# Patient Record
Sex: FEMALE | Race: WHITE | NOT HISPANIC OR LATINO | URBAN - METROPOLITAN AREA
[De-identification: names, ages, dates, MRNs, and addresses within clinical notes are randomized per-mention and may not be internally consistent; named-entity substitution may affect disease eponyms.]

---

## 2017-06-12 ENCOUNTER — OUTPATIENT (OUTPATIENT)
Dept: OUTPATIENT SERVICES | Facility: HOSPITAL | Age: 32
LOS: 1 days | Discharge: HOME | End: 2017-06-12

## 2017-06-28 DIAGNOSIS — Z34.00 ENCOUNTER FOR SUPERVISION OF NORMAL FIRST PREGNANCY, UNSPECIFIED TRIMESTER: ICD-10-CM

## 2017-06-29 ENCOUNTER — OUTPATIENT (OUTPATIENT)
Dept: OUTPATIENT SERVICES | Facility: HOSPITAL | Age: 32
LOS: 1 days | Discharge: HOME | End: 2017-06-29

## 2017-06-29 DIAGNOSIS — Z34.00 ENCOUNTER FOR SUPERVISION OF NORMAL FIRST PREGNANCY, UNSPECIFIED TRIMESTER: ICD-10-CM

## 2017-08-04 ENCOUNTER — INPATIENT (INPATIENT)
Facility: HOSPITAL | Age: 32
LOS: 0 days | Discharge: HOME | End: 2017-08-05
Attending: OBSTETRICS & GYNECOLOGY | Admitting: OBSTETRICS & GYNECOLOGY

## 2017-08-08 DIAGNOSIS — Z34.83 ENCOUNTER FOR SUPERVISION OF OTHER NORMAL PREGNANCY, THIRD TRIMESTER: ICD-10-CM

## 2017-08-08 DIAGNOSIS — Z88.0 ALLERGY STATUS TO PENICILLIN: ICD-10-CM

## 2017-08-08 DIAGNOSIS — Z88.8 ALLERGY STATUS TO OTHER DRUGS, MEDICAMENTS AND BIOLOGICAL SUBSTANCES: ICD-10-CM

## 2017-08-08 DIAGNOSIS — Z3A.40 40 WEEKS GESTATION OF PREGNANCY: ICD-10-CM

## 2017-09-06 PROBLEM — Z00.00 ENCOUNTER FOR PREVENTIVE HEALTH EXAMINATION: Status: ACTIVE | Noted: 2017-09-06

## 2018-02-19 ENCOUNTER — RX RENEWAL (OUTPATIENT)
Age: 33
End: 2018-02-19

## 2018-07-06 ENCOUNTER — OUTPATIENT (OUTPATIENT)
Dept: OUTPATIENT SERVICES | Facility: HOSPITAL | Age: 33
LOS: 1 days | Discharge: HOME | End: 2018-07-06

## 2018-07-06 DIAGNOSIS — Z00.00 ENCOUNTER FOR GENERAL ADULT MEDICAL EXAMINATION WITHOUT ABNORMAL FINDINGS: ICD-10-CM

## 2018-10-08 ENCOUNTER — OUTPATIENT (OUTPATIENT)
Dept: OUTPATIENT SERVICES | Facility: HOSPITAL | Age: 33
LOS: 1 days | Discharge: HOME | End: 2018-10-08

## 2018-10-08 DIAGNOSIS — I07.1 RHEUMATIC TRICUSPID INSUFFICIENCY: ICD-10-CM

## 2018-10-08 DIAGNOSIS — I37.1 NONRHEUMATIC PULMONARY VALVE INSUFFICIENCY: ICD-10-CM

## 2018-10-08 DIAGNOSIS — R00.2 PALPITATIONS: ICD-10-CM

## 2018-10-08 DIAGNOSIS — I34.0 NONRHEUMATIC MITRAL (VALVE) INSUFFICIENCY: ICD-10-CM

## 2018-10-31 ENCOUNTER — LABORATORY RESULT (OUTPATIENT)
Age: 33
End: 2018-10-31

## 2018-10-31 ENCOUNTER — OUTPATIENT (OUTPATIENT)
Dept: OUTPATIENT SERVICES | Facility: HOSPITAL | Age: 33
LOS: 1 days | Discharge: HOME | End: 2018-10-31

## 2018-10-31 DIAGNOSIS — N91.2 AMENORRHEA, UNSPECIFIED: ICD-10-CM

## 2018-11-06 ENCOUNTER — LABORATORY RESULT (OUTPATIENT)
Age: 33
End: 2018-11-06

## 2018-11-06 ENCOUNTER — OUTPATIENT (OUTPATIENT)
Dept: OUTPATIENT SERVICES | Facility: HOSPITAL | Age: 33
LOS: 1 days | Discharge: HOME | End: 2018-11-06

## 2018-11-06 ENCOUNTER — APPOINTMENT (OUTPATIENT)
Dept: OBGYN | Facility: CLINIC | Age: 33
End: 2018-11-06
Payer: COMMERCIAL

## 2018-11-06 VITALS — HEIGHT: 66 IN | WEIGHT: 128 LBS | BODY MASS INDEX: 20.57 KG/M2

## 2018-11-06 DIAGNOSIS — N91.2 AMENORRHEA, UNSPECIFIED: ICD-10-CM

## 2018-11-06 PROCEDURE — 81003 URINALYSIS AUTO W/O SCOPE: CPT | Mod: QW

## 2018-11-06 PROCEDURE — 76830 TRANSVAGINAL US NON-OB: CPT

## 2018-11-06 PROCEDURE — 99395 PREV VISIT EST AGE 18-39: CPT | Mod: 25

## 2018-11-12 DIAGNOSIS — Z01.419 ENCOUNTER FOR GYNECOLOGICAL EXAMINATION (GENERAL) (ROUTINE) WITHOUT ABNORMAL FINDINGS: ICD-10-CM

## 2018-11-18 LAB
BILIRUB UR QL STRIP: NORMAL
CLARITY UR: CLEAR
GLUCOSE UR-MCNC: NORMAL
HCG UR QL: NORMAL EU/DL
HGB UR QL STRIP.AUTO: NORMAL
KETONES UR-MCNC: NORMAL
LEUKOCYTE ESTERASE UR QL STRIP: NORMAL
NITRITE UR QL STRIP: NORMAL
PH UR STRIP: 5
PROT UR STRIP-MCNC: NORMAL
SP GR UR STRIP: 1.02

## 2018-11-19 ENCOUNTER — APPOINTMENT (OUTPATIENT)
Dept: OBGYN | Facility: CLINIC | Age: 33
End: 2018-11-19
Payer: COMMERCIAL

## 2018-11-19 VITALS — BODY MASS INDEX: 20.5 KG/M2 | WEIGHT: 127 LBS

## 2018-11-19 PROCEDURE — 99213 OFFICE O/P EST LOW 20 MIN: CPT | Mod: 25

## 2018-11-19 PROCEDURE — 76830 TRANSVAGINAL US NON-OB: CPT

## 2018-11-28 ENCOUNTER — LABORATORY RESULT (OUTPATIENT)
Age: 33
End: 2018-11-28

## 2018-11-28 ENCOUNTER — OUTPATIENT (OUTPATIENT)
Dept: OUTPATIENT SERVICES | Facility: HOSPITAL | Age: 33
LOS: 1 days | Discharge: HOME | End: 2018-11-28

## 2018-11-28 DIAGNOSIS — Z34.00 ENCOUNTER FOR SUPERVISION OF NORMAL FIRST PREGNANCY, UNSPECIFIED TRIMESTER: ICD-10-CM

## 2018-12-10 ENCOUNTER — APPOINTMENT (OUTPATIENT)
Dept: OBGYN | Facility: CLINIC | Age: 33
End: 2018-12-10
Payer: COMMERCIAL

## 2018-12-10 VITALS — BODY MASS INDEX: 20.73 KG/M2 | WEIGHT: 129 LBS | HEIGHT: 66 IN

## 2018-12-10 PROCEDURE — 0502F SUBSEQUENT PRENATAL CARE: CPT

## 2018-12-15 ENCOUNTER — NON-APPOINTMENT (OUTPATIENT)
Age: 33
End: 2018-12-15

## 2018-12-26 ENCOUNTER — NON-APPOINTMENT (OUTPATIENT)
Age: 33
End: 2018-12-26

## 2018-12-30 LAB
GLUCOSE UR-MCNC: NORMAL
HGB UR QL STRIP.AUTO: NORMAL
KETONES UR-MCNC: NORMAL
LEUKOCYTE ESTERASE UR QL STRIP: NORMAL
NITRITE UR QL STRIP: NORMAL
PROT UR STRIP-MCNC: NORMAL

## 2018-12-31 ENCOUNTER — APPOINTMENT (OUTPATIENT)
Dept: OBGYN | Facility: CLINIC | Age: 33
End: 2018-12-31
Payer: COMMERCIAL

## 2018-12-31 VITALS — BODY MASS INDEX: 20.98 KG/M2 | WEIGHT: 130 LBS

## 2018-12-31 DIAGNOSIS — N93.8 OTHER SPECIFIED ABNORMAL UTERINE AND VAGINAL BLEEDING: ICD-10-CM

## 2018-12-31 DIAGNOSIS — M94.0 CHONDROCOSTAL JUNCTION SYNDROME [TIETZE]: ICD-10-CM

## 2018-12-31 PROCEDURE — 0502F SUBSEQUENT PRENATAL CARE: CPT

## 2019-01-01 ENCOUNTER — NON-APPOINTMENT (OUTPATIENT)
Age: 34
End: 2019-01-01

## 2019-01-01 PROBLEM — M94.0 COSTOCHONDRITIS: Status: ACTIVE | Noted: 2019-01-01

## 2019-01-01 PROBLEM — N93.8 DYSFUNCTIONAL UTERINE BLEEDING: Status: ACTIVE | Noted: 2019-01-01

## 2019-01-07 ENCOUNTER — NON-APPOINTMENT (OUTPATIENT)
Age: 34
End: 2019-01-07

## 2019-01-16 ENCOUNTER — NON-APPOINTMENT (OUTPATIENT)
Age: 34
End: 2019-01-16

## 2019-01-22 ENCOUNTER — NON-APPOINTMENT (OUTPATIENT)
Age: 34
End: 2019-01-22

## 2019-01-22 ENCOUNTER — APPOINTMENT (OUTPATIENT)
Dept: OBGYN | Facility: CLINIC | Age: 34
End: 2019-01-22
Payer: COMMERCIAL

## 2019-01-22 VITALS
WEIGHT: 133 LBS | BODY MASS INDEX: 21.38 KG/M2 | HEIGHT: 66 IN | SYSTOLIC BLOOD PRESSURE: 118 MMHG | DIASTOLIC BLOOD PRESSURE: 70 MMHG

## 2019-01-22 LAB
BILIRUB UR QL STRIP: NORMAL
CLARITY UR: CLEAR
GLUCOSE UR-MCNC: NORMAL
HCG UR QL: NORMAL EU/DL
HGB UR QL STRIP.AUTO: NORMAL
KETONES UR-MCNC: NORMAL
LEUKOCYTE ESTERASE UR QL STRIP: NORMAL
NITRITE UR QL STRIP: NORMAL
PH UR STRIP: NORMAL
PROT UR STRIP-MCNC: NORMAL
SP GR UR STRIP: NORMAL

## 2019-01-22 PROCEDURE — 0502F SUBSEQUENT PRENATAL CARE: CPT

## 2019-01-24 ENCOUNTER — RX CHANGE (OUTPATIENT)
Age: 34
End: 2019-01-24

## 2019-01-24 RX ORDER — VITAMIN A, ASCORBIC ACID, VITAMIN D, .ALPHA.-TOCOPHEROL, THIAMINE MONONITRATE, RIBOFLAVIN, NIACIN, PYRIDOXINE HYDROCHLORIDE, FOLIC ACID, CYANOCOBALAMIN, CALCIUM, IRON, MAGNESIUM, ZINC, AND COPPER 2700; 70; 400; 30; 1.6; 1.8; 18; 2.5; 1; 12; 100; 65; 25; 25; 2 [IU]/1; MG/1; [IU]/1; [IU]/1; MG/1; MG/1; MG/1; MG/1; MG/1; UG/1; MG/1; MG/1; MG/1; MG/1; MG/1
TABLET ORAL DAILY
Qty: 90 | Refills: 0 | Status: ACTIVE | COMMUNITY
Start: 2019-01-24

## 2019-01-24 RX ORDER — ASCORBIC ACID, CHOLECALCIFEROL, .ALPHA.-TOCOPHEROL ACETATE, DL-, PYRIDOXINE, FOLIC ACID, CYANOCOBALAMIN, CALCIUM, FERROUS FUMARATE, MAGNESIUM, DOCONEXENT 85; 200; 10; 25; 1; 12; 140; 27; 45; 300 [IU]/1; [IU]/1; [IU]/1; [IU]/1; MG/1; UG/1; MG/1; MG/1; MG/1; MG/1
27-0.6-0.4-3 CAPSULE, GELATIN COATED ORAL
Qty: 90 | Refills: 2 | Status: DISCONTINUED | COMMUNITY
Start: 2018-02-19 | End: 2019-01-24

## 2019-01-28 ENCOUNTER — RX RENEWAL (OUTPATIENT)
Age: 34
End: 2019-01-28

## 2019-02-06 ENCOUNTER — RESULT CHARGE (OUTPATIENT)
Age: 34
End: 2019-02-06

## 2019-02-06 ENCOUNTER — CHART COPY (OUTPATIENT)
Age: 34
End: 2019-02-06

## 2019-02-14 ENCOUNTER — NON-APPOINTMENT (OUTPATIENT)
Age: 34
End: 2019-02-14

## 2019-02-14 ENCOUNTER — APPOINTMENT (OUTPATIENT)
Dept: OBGYN | Facility: CLINIC | Age: 34
End: 2019-02-14
Payer: COMMERCIAL

## 2019-02-14 VITALS — DIASTOLIC BLOOD PRESSURE: 70 MMHG | SYSTOLIC BLOOD PRESSURE: 120 MMHG | WEIGHT: 132 LBS | BODY MASS INDEX: 21.31 KG/M2

## 2019-02-14 PROCEDURE — 0502F SUBSEQUENT PRENATAL CARE: CPT

## 2019-02-14 PROCEDURE — 76805 OB US >/= 14 WKS SNGL FETUS: CPT

## 2019-03-05 ENCOUNTER — NON-APPOINTMENT (OUTPATIENT)
Age: 34
End: 2019-03-05

## 2019-03-05 ENCOUNTER — APPOINTMENT (OUTPATIENT)
Dept: OBGYN | Facility: CLINIC | Age: 34
End: 2019-03-05
Payer: COMMERCIAL

## 2019-03-05 VITALS — WEIGHT: 136 LBS | BODY MASS INDEX: 21.86 KG/M2 | HEIGHT: 66 IN

## 2019-03-05 PROCEDURE — 0502F SUBSEQUENT PRENATAL CARE: CPT

## 2019-03-09 ENCOUNTER — APPOINTMENT (OUTPATIENT)
Dept: OBGYN | Facility: CLINIC | Age: 34
End: 2019-03-09
Payer: COMMERCIAL

## 2019-03-09 ENCOUNTER — OUTPATIENT (OUTPATIENT)
Dept: OUTPATIENT SERVICES | Facility: HOSPITAL | Age: 34
LOS: 1 days | Discharge: HOME | End: 2019-03-09

## 2019-03-09 ENCOUNTER — LABORATORY RESULT (OUTPATIENT)
Age: 34
End: 2019-03-09

## 2019-03-09 DIAGNOSIS — Z34.00 ENCOUNTER FOR SUPERVISION OF NORMAL FIRST PREGNANCY, UNSPECIFIED TRIMESTER: ICD-10-CM

## 2019-03-09 PROCEDURE — 0502F SUBSEQUENT PRENATAL CARE: CPT

## 2019-03-11 ENCOUNTER — NON-APPOINTMENT (OUTPATIENT)
Age: 34
End: 2019-03-11

## 2019-03-26 ENCOUNTER — APPOINTMENT (OUTPATIENT)
Dept: OBGYN | Facility: CLINIC | Age: 34
End: 2019-03-26
Payer: COMMERCIAL

## 2019-03-26 ENCOUNTER — NON-APPOINTMENT (OUTPATIENT)
Age: 34
End: 2019-03-26

## 2019-03-26 VITALS — DIASTOLIC BLOOD PRESSURE: 70 MMHG | BODY MASS INDEX: 22.11 KG/M2 | WEIGHT: 137 LBS | SYSTOLIC BLOOD PRESSURE: 120 MMHG

## 2019-03-26 PROCEDURE — 76819 FETAL BIOPHYS PROFIL W/O NST: CPT

## 2019-03-26 PROCEDURE — 76705 ECHO EXAM OF ABDOMEN: CPT

## 2019-03-26 PROCEDURE — 76815 OB US LIMITED FETUS(S): CPT

## 2019-03-26 PROCEDURE — 0502F SUBSEQUENT PRENATAL CARE: CPT

## 2019-04-03 ENCOUNTER — OUTPATIENT (OUTPATIENT)
Dept: OUTPATIENT SERVICES | Facility: HOSPITAL | Age: 34
LOS: 1 days | Discharge: HOME | End: 2019-04-03

## 2019-04-03 ENCOUNTER — APPOINTMENT (OUTPATIENT)
Dept: ANTEPARTUM | Facility: CLINIC | Age: 34
End: 2019-04-03

## 2019-04-04 ENCOUNTER — NON-APPOINTMENT (OUTPATIENT)
Age: 34
End: 2019-04-04

## 2019-04-05 ENCOUNTER — OUTPATIENT (OUTPATIENT)
Dept: OUTPATIENT SERVICES | Facility: HOSPITAL | Age: 34
LOS: 1 days | Discharge: HOME | End: 2019-04-05
Payer: COMMERCIAL

## 2019-04-05 PROCEDURE — 74181 MRI ABDOMEN W/O CONTRAST: CPT | Mod: 26

## 2019-04-08 ENCOUNTER — APPOINTMENT (OUTPATIENT)
Dept: ANTEPARTUM | Facility: CLINIC | Age: 34
End: 2019-04-08

## 2019-04-09 DIAGNOSIS — O36.5990 MATERNAL CARE FOR OTHER KNOWN OR SUSPECTED POOR FETAL GROWTH, UNSPECIFIED TRIMESTER, NOT APPLICABLE OR UNSPECIFIED: ICD-10-CM

## 2019-04-11 ENCOUNTER — NON-APPOINTMENT (OUTPATIENT)
Age: 34
End: 2019-04-11

## 2019-04-13 ENCOUNTER — APPOINTMENT (OUTPATIENT)
Dept: OBGYN | Facility: CLINIC | Age: 34
End: 2019-04-13

## 2019-04-15 ENCOUNTER — APPOINTMENT (OUTPATIENT)
Dept: OBGYN | Facility: CLINIC | Age: 34
End: 2019-04-15

## 2019-04-15 VITALS — HEIGHT: 66 IN | WEIGHT: 143 LBS | BODY MASS INDEX: 22.98 KG/M2

## 2019-04-16 ENCOUNTER — NON-APPOINTMENT (OUTPATIENT)
Age: 34
End: 2019-04-16

## 2019-04-20 ENCOUNTER — NON-APPOINTMENT (OUTPATIENT)
Age: 34
End: 2019-04-20

## 2019-04-22 ENCOUNTER — NON-APPOINTMENT (OUTPATIENT)
Age: 34
End: 2019-04-22

## 2019-04-29 ENCOUNTER — NON-APPOINTMENT (OUTPATIENT)
Age: 34
End: 2019-04-29

## 2019-04-29 ENCOUNTER — APPOINTMENT (OUTPATIENT)
Dept: OBGYN | Facility: CLINIC | Age: 34
End: 2019-04-29
Payer: COMMERCIAL

## 2019-04-29 VITALS — WEIGHT: 144 LBS | HEIGHT: 66 IN | BODY MASS INDEX: 23.14 KG/M2

## 2019-04-29 PROCEDURE — 0502F SUBSEQUENT PRENATAL CARE: CPT

## 2019-04-30 ENCOUNTER — NON-APPOINTMENT (OUTPATIENT)
Age: 34
End: 2019-04-30

## 2019-05-01 ENCOUNTER — APPOINTMENT (OUTPATIENT)
Dept: ANTEPARTUM | Facility: CLINIC | Age: 34
End: 2019-05-01
Payer: COMMERCIAL

## 2019-05-01 ENCOUNTER — RESULT CHARGE (OUTPATIENT)
Age: 34
End: 2019-05-01

## 2019-05-01 ENCOUNTER — OUTPATIENT (OUTPATIENT)
Dept: OUTPATIENT SERVICES | Facility: HOSPITAL | Age: 34
LOS: 1 days | Discharge: HOME | End: 2019-05-01

## 2019-05-01 VITALS
DIASTOLIC BLOOD PRESSURE: 88 MMHG | TEMPERATURE: 98 F | BODY MASS INDEX: 23.4 KG/M2 | HEART RATE: 96 BPM | WEIGHT: 145 LBS | OXYGEN SATURATION: 100 % | SYSTOLIC BLOOD PRESSURE: 146 MMHG

## 2019-05-01 DIAGNOSIS — O36.5990 MATERNAL CARE FOR OTHER KNOWN OR SUSPECTED POOR FETAL GROWTH, UNSPECIFIED TRIMESTER, NOT APPLICABLE OR UNSPECIFIED: ICD-10-CM

## 2019-05-01 DIAGNOSIS — O28.3 ABNORMAL ULTRASONIC FINDING ON ANTENATAL SCREENING OF MOTHER: ICD-10-CM

## 2019-05-01 DIAGNOSIS — Z78.9 OTHER SPECIFIED HEALTH STATUS: ICD-10-CM

## 2019-05-01 DIAGNOSIS — O98.519 OTHER VIRAL DISEASES COMPLICATING PREGNANCY, UNSPECIFIED TRIMESTER: ICD-10-CM

## 2019-05-01 DIAGNOSIS — Z82.49 FAMILY HISTORY OF ISCHEMIC HEART DISEASE AND OTHER DISEASES OF THE CIRCULATORY SYSTEM: ICD-10-CM

## 2019-05-01 DIAGNOSIS — Z83.3 FAMILY HISTORY OF DIABETES MELLITUS: ICD-10-CM

## 2019-05-01 LAB
BILIRUB UR QL STRIP: NEGATIVE
CLARITY UR: CLEAR
COLLECTION METHOD: NORMAL
FETAL HEART DESCRIPTION: NORMAL
FETAL HEART RATE (BPM): 143
FETAL MOVEMENT: PRESENT
GLUCOSE UR-MCNC: NEGATIVE
HCG UR QL: 0.2 EU/DL
HGB UR QL STRIP.AUTO: NEGATIVE
KETONES UR-MCNC: NEGATIVE
LEUKOCYTE ESTERASE UR QL STRIP: NEGATIVE
NITRITE UR QL STRIP: NEGATIVE
PH UR STRIP: 6
PROT UR STRIP-MCNC: NEGATIVE
SP GR UR STRIP: 1.01
URINE ALBUMIN/PROTEIN: NEGATIVE
URINE GLUCOSE: NEGATIVE
URINE KETONES: NEGATIVE

## 2019-05-01 PROCEDURE — 99214 OFFICE O/P EST MOD 30 MIN: CPT

## 2019-05-01 NOTE — ACTIVE PROBLEMS
[Heart Disease] : no heart disease [Diabetes Mellitus] : no diabetes mellitus [Hypertension] : no hypertension [Renal Disease] : no kidney disease, no UTI [Autoimmune Disease] : no autoimmune disease [Neurologic Disorder] : no neurologic disorder, no epilepsy [Hepatic Disorder] : no hepatitis, no liver disease [Depression] : no depression, no post partum depression [Psychiatric Disorders] : no psychiatric disorders [Thyroid Disorder] : no thyroid dysfunction [Thrombophlebitis] : no varicosities, no phlebitis [Trauma] : no trauma/violence [Blood Transfusion (___ Ml)] : no history of blood transfusion

## 2019-05-01 NOTE — HISTORY OF PRESENT ILLNESS
[FreeTextEntry1] : 34yo  at 31w3d GA by LMP confirmed by 7wk sonogram new pregnancy consult for fetal umbilical vein varix and findings of CMV IgM positivity.\par \par Umbililcal vein varix was first identified on anatomy scan, pt has been following with Dr Echols for ultrasounds.  She was also tested for TORCH infections 19 and found to be CMV IgM positive 1.42, IgG negative.  This was repeated on 19 with PCR, IgM again found to be elevated 1.33, IgG negative, PCR <34.5 which is normal.  Pt denies any constitutional symptoms, is not aware of any exposure.  Pt has 2 young children at home but denies that they have been sick recently.   \par \par Pt had fetal ECHO performed that showed small pericardial effusion otherwise normal per pt.  Pt is going for repeat fetal ECHO this Friday.  Pt also had fetal MRI which confirmed the intraabdominal vein varix measuring approximately 11mm, showed no other abnormalities.  Pt had NIPs screening which showed no abnormalities.  Pt has two other healthy children.  No other issues during this pregnancy.

## 2019-05-01 NOTE — DISCUSSION/SUMMARY
[FreeTextEntry1] : 32yo  at 31w3d GA by LMP c/w first trimester sonogram new consult for umbilical vein varix and finding of CMV IgM positivity.\par \par Denies ctx, LOF, vaginal bleeding.  Reports good fetal movement.\par \par Umbilical vein varix\par -fetal ECHO wnl\par -fetal MRI showed varix 11mm, no other anatomical abnormalities\par -NIPs screening low risk\par -being followed by Dr Pitts with weekly scans and dopplers\par -recommend serial growths with dopplers, so continue current mgmt\par -pt counseled about this finding and that in the presence of small pericardial effusion and otherwise normal ECHO as well as normal anatomic survey on ultrasound and MRI that this finding is isolated and unlikely to be clinically significant\par \par CMV IgM positivity\par -IgG negative typically indicates recent infection\par -pt denies symptoms or exposure\par -PCR negative\par -discussed with pt that these values are difficult to interpret and can repeat titers in a few weeks to check for IgG seroconversion, however this information will not guide management\par -explained to pt that 98% of women exposed to CMV during pregnancy do not pass the infection to the fetus\par -also discussed that ultrasound findings are not consistent with fetal CMV infection (i.e. absence of ventriculomegaly, periventricular calcifications, microcephaly, hepatosplenomegaly, IUGR)\par -explained to patient that if the fetus is affected there is a small chance that the infant could be born with rash, jaundice, be SGA, have seizures, and develop sensorineural hearing loss  \par \par pregnancy\par -follows with Dr Leblanc for prenatal care\par -taking prenatal vitamin\par -GCT 66\par \par Continue growth scans and dopplers with PMD and Dr Foster\par Return to [facility] prn.

## 2019-05-01 NOTE — OB HISTORY
[___] : of [unfilled] [Pregnancy History] : boy [LMP: ___] : LMP: [unfilled] [DANIEL: ___] : DANIEL: [unfilled] [at ___ wks] : at [unfilled] weeks [Sonogram] : sonogram

## 2019-05-01 NOTE — SURGICAL HISTORY
[Fibroids] : no fibroids [Breast Disease] : no breast disease [STI's] : no STI's [Infertility] : no infertility [Cysts] : no cysts

## 2019-05-01 NOTE — PAST MEDICAL HISTORY
[HIV Infection] : no HIV [Exposure To Gonorrhea] : no gonorrhea [Herpes Simplex] : no genital herpes [Syphilis] : no syphilis [Chlamydial Infections] : no chlamydia [Human Papilloma Virus Infection] : no genital warts [Hepatitis, C Virus] : no Hepatitis C [Hepatitis, B Virus] : no Hepatitis B [Trichomoniasis] : no trichomoniasis

## 2019-05-13 ENCOUNTER — APPOINTMENT (OUTPATIENT)
Dept: OBGYN | Facility: CLINIC | Age: 34
End: 2019-05-13
Payer: COMMERCIAL

## 2019-05-13 ENCOUNTER — NON-APPOINTMENT (OUTPATIENT)
Age: 34
End: 2019-05-13

## 2019-05-13 VITALS — SYSTOLIC BLOOD PRESSURE: 120 MMHG | BODY MASS INDEX: 24.05 KG/M2 | DIASTOLIC BLOOD PRESSURE: 70 MMHG | WEIGHT: 149 LBS

## 2019-05-13 LAB
BILIRUB UR QL STRIP: NORMAL
CLARITY UR: CLEAR
GLUCOSE UR-MCNC: NORMAL
GLUCOSE UR-MCNC: NORMAL
HCG UR QL: NORMAL EU/DL
HGB UR QL STRIP.AUTO: NORMAL
HGB UR QL STRIP.AUTO: NORMAL
KETONES UR-MCNC: NORMAL
KETONES UR-MCNC: NORMAL
LEUKOCYTE ESTERASE UR QL STRIP: NORMAL
LEUKOCYTE ESTERASE UR QL STRIP: NORMAL
NITRITE UR QL STRIP: NORMAL
NITRITE UR QL STRIP: NORMAL
PH UR STRIP: 5
PROT UR STRIP-MCNC: NORMAL
PROT UR STRIP-MCNC: NORMAL
SP GR UR STRIP: 1.02

## 2019-05-13 PROCEDURE — 0502F SUBSEQUENT PRENATAL CARE: CPT

## 2019-05-21 ENCOUNTER — NON-APPOINTMENT (OUTPATIENT)
Age: 34
End: 2019-05-21

## 2019-05-28 ENCOUNTER — NON-APPOINTMENT (OUTPATIENT)
Age: 34
End: 2019-05-28

## 2019-05-30 ENCOUNTER — OUTPATIENT (OUTPATIENT)
Dept: OUTPATIENT SERVICES | Facility: HOSPITAL | Age: 34
LOS: 1 days | Discharge: HOME | End: 2019-05-30

## 2019-05-30 ENCOUNTER — APPOINTMENT (OUTPATIENT)
Dept: OBGYN | Facility: CLINIC | Age: 34
End: 2019-05-30
Payer: COMMERCIAL

## 2019-05-30 ENCOUNTER — LABORATORY RESULT (OUTPATIENT)
Age: 34
End: 2019-05-30

## 2019-05-30 VITALS — WEIGHT: 148 LBS | BODY MASS INDEX: 23.78 KG/M2 | HEIGHT: 66 IN

## 2019-05-30 DIAGNOSIS — Z34.00 ENCOUNTER FOR SUPERVISION OF NORMAL FIRST PREGNANCY, UNSPECIFIED TRIMESTER: ICD-10-CM

## 2019-05-30 PROCEDURE — 0502F SUBSEQUENT PRENATAL CARE: CPT

## 2019-05-31 ENCOUNTER — NON-APPOINTMENT (OUTPATIENT)
Age: 34
End: 2019-05-31

## 2019-06-02 ENCOUNTER — OUTPATIENT (OUTPATIENT)
Dept: OUTPATIENT SERVICES | Facility: HOSPITAL | Age: 34
LOS: 1 days | Discharge: HOME | End: 2019-06-02

## 2019-06-02 VITALS
DIASTOLIC BLOOD PRESSURE: 88 MMHG | HEART RATE: 111 BPM | RESPIRATION RATE: 18 BRPM | SYSTOLIC BLOOD PRESSURE: 136 MMHG | TEMPERATURE: 98 F

## 2019-06-02 VITALS — DIASTOLIC BLOOD PRESSURE: 88 MMHG | HEART RATE: 111 BPM | SYSTOLIC BLOOD PRESSURE: 136 MMHG

## 2019-06-02 DIAGNOSIS — Z98.890 OTHER SPECIFIED POSTPROCEDURAL STATES: Chronic | ICD-10-CM

## 2019-06-02 NOTE — OB RN TRIAGE NOTE - NS_NUMBOFVISITS_OBGYN_ALL_OB_NU
Lymphadenopathy   WHAT YOU NEED TO KNOW:   Lymphadenopathy is swelling of your lymph nodes  Lymph nodes are small organs that are part of your immune system  The lymph nodes are found throughout your body  They are most easily felt in your neck, under your arms, and near your groin  Lymphadenopathy can occur in one or more areas of your body  It is usually caused by an infection  DISCHARGE INSTRUCTIONS:   Return to the emergency department if:   · The swollen lymph nodes bleed  · You have swollen lymph nodes in your neck that affect your breathing or swallowing  Contact your healthcare provider if:   · You have a fever  · You have a new swollen and painful lymph node  · You have a skin rash  · Your lymph node remains swollen or painful, or it gets bigger  · Your lymph node has red streaks around it, or the skin around the lymph node is red  · You have questions or concerns about your condition or care  Follow up with your healthcare provider as directed:  Write down your questions so you remember to ask them during your visits  Self-care:   · Do not poke or squeeze  the swollen lymph nodes  · Apply heat to the swollen glands  You may use warm compresses, or an electric heating pad set on low  · Rest as needed  If you have a fever, rest until your temperature returns to normal  Return to your normal daily activities slowly after your fever is gone  © 2017 [address] Information is for End User's use only and may not be sold, redistributed or otherwise used for commercial purposes  All illustrations and images included in CareNotes® are the copyrighted property of A D A M , Inc  or George Pacheco  The above information is an  only  It is not intended as medical advice for individual conditions or treatments  Talk to your doctor, nurse or pharmacist before following any medical regimen to see if it is safe and effective for you  15

## 2019-06-02 NOTE — OB PROVIDER TRIAGE NOTE - NSOBPROVIDERNOTE_OBGYN_ALL_OB_FT
32 yo  at 35w5d, GBS pos, 34 yo  at 35w5d, GBS pos, CMV IgM pos at 27w, IgG neg, measles/rubella immune, w/ umbilical vein varix of 11 mm, fetal MRI revealed intraabdominal vein varix measuring up to approximately 11 mm and the fronto-occipital diameter measurement is at the lower limits of normal for GA, remaining intracranial measurements are normal for GA, fetal heart evaluation revealed mild pericardial effusion per pediatric cardiologist, BPP 10/10, fetal wellbeing reassured,     - labor precautions/fetal kick count instructions  -Encourage PO hydration/ambulation  -Discharge home  -F/u w/ PMD as scheduled, sono appt on 6/3/2019    Dr. Salas and Dr. Leblanc to be made aware. 32 yo  at 35w5d, GBS pos, CMV IgM pos at 27w, IgG neg, measles/rubella immune, w/ IUGR w/ normal dopplers, w/ umbilical vein varix of 11 mm at 27w, mild pericardial effusion on fetal echo, BPP 10/10, fetal and maternal wellbeing reassured,     - labor precautions/fetal kick count instructions  -Encourage PO hydration/ambulation  -Repeat CMV antibodies as planned  -Discharge home  -F/u w/ OB/GYN, MFM as scheduled, sono appt on 6/3/2019  -Pain management prn    Dr. Salas and Dr. Leblanc aware.

## 2019-06-02 NOTE — OB PROVIDER TRIAGE NOTE - HISTORY OF PRESENT ILLNESS
32 yo  at 35w3d w/ DANIEL of 2019 by LMP c/w 1st trimester sonogram here for decreased FM all day. Pt reports that she felt the baby move all day but she did not feel like the baby was as active as before. She got worried because since last week baby is diagnosed with IUGR and umbilical vein varix. Denies contractions, LOF and VB. Last PO intake was ice cream at 2000, which the baby reacted to but still pt felt like the baby was "slower." No other complications in this pregnancy. Last PMD visit was 2019, VE was done, cervix was C/L/P.     SH: denies tobacco, alcohol and illicit drug use   Meds: none  Allergies: penicillin (anaphylaxis), NSAIDS and Tylenol (wheezing, stuffiness and cough) 32 yo  at 35w5d w/ DANIEL of 2019 by LMP c/w 1st trimester sonogram here for decreased FM all day. Pt reports that she felt the baby move all day but she did not feel like the baby was as active as before. She got worried because since last week baby is diagnosed with IUGR and umbilical vein varix. Denies contractions, LOF and VB. Last PO intake was ice cream at 2000, which the baby reacted to but still pt felt like the baby was "slower." No other complications in this pregnancy. Last PMD visit was 2019, VE was done, cervix was C/L/P.     SH: denies tobacco, alcohol and illicit drug use   Meds: none  Allergies: penicillin (anaphylaxis), NSAIDS and Tylenol (wheezing, stuffiness and cough) 32 yo  at 35w5d w/ DANIEL of 2019 by LMP c/w 1st trimester sonogram here for decreased FM all day. Pt reports that she felt the baby move all day but she did not feel like the baby was as active as before. She got worried because since last week baby is diagnosed with IUGR and umbilical vein varix since 27w of 11mm. Denies contractions, LOF and VB. Last PO intake was ice cream at 2000, which the baby reacted to but still pt felt like the baby was "slower." Last recorded EFW 1624 grams (18%) at 33w and last recorded doppler studies wnl at 34w. No other complications in this pregnancy. Fetal MRI revealed intraabdominal vein varix measuring up to approximately 11 mm and the fronto-occipital diameter measurement is at the lower limits of normal for GA, remaining intracranial measurements are normal for GA. Fetal heart evaluation revealed mild pericardial effusion per pediatric cardiologist. Last PMD visit was 2019, VE was done, cervix was C/L/P.     SH: denies tobacco, alcohol and illicit drug use   Meds: none  Allergies: penicillin (anaphylaxis), NSAIDS and Tylenol (wheezing, stuffiness and cough)

## 2019-06-02 NOTE — OB PROVIDER TRIAGE NOTE - NSHPLABSRESULTS_GEN_ALL_CORE
Sonos:  34w: cephalic, 3vc, anterior placenta, SANGITA 142 mm, MVP 40 mm, BPP 8/8, doppler studies wnl   33w: EFW 1624 grams (18%),  bpm, cephalic, 3vc, anterior placenta, MVP 57 mm, anatomy wnl except ventral wall with enlarged umbilical cord and liver with umbilical vein varix, BPP 8/8, doppler studies wnl  32w: cephalic, 3vc, anterior placenta, SANGITA 137 mm, MVP 53 mm, BPP 8/8, doppler studies wnl, fetal heart evaluation revealed mild pericardial effusion per pediatric cardiologist 4/2/19: CMV IgG neg, CMV IgM high (1.42)  4/16/19: CMV IgG neg, CMV IgM high (1.33)    Sonos:  34w: cephalic, 3vc, anterior placenta, SANGITA 142 mm, MVP 40 mm, BPP 8/8, doppler studies wnl   33w: EFW 1624 grams (18%),  bpm, cephalic, 3vc, anterior placenta, MVP 57 mm, anatomy wnl except ventral wall with enlarged umbilical cord and liver with umbilical vein varix, BPP 8/8, doppler studies wnl  32w: cephalic, 3vc, anterior placenta, SANGITA 137 mm, MVP 53 mm, BPP 8/8, doppler studies wnl, fetal heart evaluation revealed mild pericardial effusion per pediatric cardiologist  31w: 1309 grams (24%),  bpm, cephalic, 3vc, anterior placenta, MVP 64 mm, anatomy wnl except ventral wall with enlarged umbilical cord and liver with umbilical vein varix, BPP 8/8, doppler studies wnl   30w: cephalic, 3vc, anterior placenta, MVP 44 mm, SANGITA wnl, BPP 8/8, doppler studies wnl  29w: EFW 1090 grams (32%), transverse,  bpm, 3vc, anterior placenta, MVP 48 mm, anatomy wnl except ventral wall with enlarged umbilical cord and liver with umbilical vein varix and pericardial effusion, doppler studies wnl 4/2/19: CMV IgG neg, CMV IgM high (1.42)  4/16/19: CMV IgG neg, CMV IgM high (1.33)  Sequential low risk, refused amniocentesis   NIPT low risk    Sonos:  34w: cephalic, 3vc, anterior placenta, SANGITA 142 mm, MVP 40 mm, BPP 8/8, doppler studies wnl   33w: EFW 1624 grams (18%),  bpm, cephalic, 3vc, anterior placenta, MVP 57 mm, anatomy wnl except ventral wall with enlarged umbilical cord and liver with umbilical vein varix, BPP 8/8, doppler studies wnl  32w: cephalic, 3vc, anterior placenta, SANGITA 137 mm, MVP 53 mm, BPP 8/8, doppler studies wnl, fetal heart evaluation revealed mild pericardial effusion per pediatric cardiologist  31w: 1309 grams (24%),  bpm, cephalic, 3vc, anterior placenta, MVP 64 mm, anatomy wnl except ventral wall with enlarged umbilical cord and liver with umbilical vein varix, BPP 8/8, doppler studies wnl   30w: cephalic, 3vc, anterior placenta, MVP 44 mm, SANGITA wnl, BPP 8/8, doppler studies wnl  29w: EFW 1090 grams (32%), transverse,  bpm, 3vc, anterior placenta, MVP 48 mm, anatomy wnl except ventral wall with enlarged umbilical cord and liver with umbilical vein varix and pericardial effusion, doppler studies wnl  28w:  grams (27%),  bpm, cephalic, abnormal dilated fetal insertion, anterior placenta, MVP 47 mm, anatomy wnl except ventral wall with enlarged umbilical cord and liver with umbilical vein varix and pericardial effusion, umbilical cord dilated at fetal insertion, BPP 8/8, doppler studies wnl, fetal MRI revealed intraabdominal vein varix measuring up to approximately 11 mm and the fronto-occipital diameter measurement is at the lower limits of normal for GA, remaining intracranial measurements are normal for GA  27w1d:  grams (32%),  bpm, cephalic, 3vc, anterior placenta, MVP 53 mm, anatomy wnl, doppler studies wnl, pericardial effusion noted measuring 3 mm, isolated umbilical cord varix noted measuring 11 mm  27w:  grams (26%), transverse, abnormal - dilated at fetal insertion, MVP 47 mm, anatomy wnl except for ventral wall with enlarged umbilical cord and liver with umbilical vein varix  23w6d: vertex, fundal placenta, SANGITA wnl, amniotic wnl, BPP 8/8  22w: AC and HC lagging behind 1w2d, amniotic fluid wnl, anterior placenta, EFW 15 ounces, doppler studies wnl   20w2d: EFW 9 ounces, transverse, anterior placenta, amniotic fluid wnl,  bpm, size not equal to dates, no gross morphological abnormalities noted  12w2d: CL 31 mm, nuchal wnl 4/2/19: CMV IgG neg, CMV IgM high (1.42)  4/16/19: CMV IgG neg, CMV IgM high (1.33)  Sequential low risk, refused amniocentesis   NIPT low risk  GCT 66    5/30/2019:  GBS pos    5/14/2019:  Hep B neg  RPR neg  HIV neg  Measles immune    11/28/2019:  A pos, no antibodies detected  Rubella/Varicella immune    Sonos:  34w: cephalic, 3vc, anterior placenta, SANGITA 142 mm, MVP 40 mm, BPP 8/8, doppler studies wnl   33w: EFW 1624 grams (18%),  bpm, cephalic, 3vc, anterior placenta, MVP 57 mm, anatomy wnl except ventral wall with enlarged umbilical cord and liver with umbilical vein varix, BPP 8/8, doppler studies wnl  32w: cephalic, 3vc, anterior placenta, SANGITA 137 mm, MVP 53 mm, BPP 8/8, doppler studies wnl, fetal heart evaluation revealed mild pericardial effusion per pediatric cardiologist  31w: 1309 grams (24%),  bpm, cephalic, 3vc, anterior placenta, MVP 64 mm, anatomy wnl except ventral wall with enlarged umbilical cord and liver with umbilical vein varix, BPP 8/8, doppler studies wnl   30w: cephalic, 3vc, anterior placenta, MVP 44 mm, SANGITA wnl, BPP 8/8, doppler studies wnl  29w: EFW 1090 grams (32%), transverse,  bpm, 3vc, anterior placenta, MVP 48 mm, anatomy wnl except ventral wall with enlarged umbilical cord and liver with umbilical vein varix and pericardial effusion, doppler studies wnl  28w:  grams (27%),  bpm, cephalic, abnormal dilated fetal insertion, anterior placenta, MVP 47 mm, anatomy wnl except ventral wall with enlarged umbilical cord and liver with umbilical vein varix and pericardial effusion, umbilical cord dilated at fetal insertion, BPP 8/8, doppler studies wnl, fetal MRI revealed intraabdominal vein varix measuring up to approximately 11 mm and the fronto-occipital diameter measurement is at the lower limits of normal for GA, remaining intracranial measurements are normal for GA  27w1d:  grams (32%),  bpm, cephalic, 3vc, anterior placenta, MVP 53 mm, anatomy wnl, doppler studies wnl, pericardial effusion noted measuring 3 mm, isolated umbilical cord varix noted measuring 11 mm  27w:  grams (26%), transverse, abnormal - dilated at fetal insertion, MVP 47 mm, anatomy wnl except for ventral wall with enlarged umbilical cord and liver with umbilical vein varix  23w6d: vertex, fundal placenta, SANGITA wnl, amniotic wnl, BPP 8/8  22w: AC and HC lagging behind 1w2d, amniotic fluid wnl, anterior placenta, EFW 15 ounces, doppler studies wnl   20w2d: EFW 9 ounces, transverse, anterior placenta, amniotic fluid wnl,  bpm, size not equal to dates, no gross morphological abnormalities noted  12w2d: CL 31 mm, nuchal wnl

## 2019-06-02 NOTE — OB PROVIDER TRIAGE NOTE - FAMILY HISTORY
Father  Still living? Unknown  Family history of hypertension, Age at diagnosis: Age Unknown  Family history of diabetes mellitus, Age at diagnosis: Age Unknown

## 2019-06-02 NOTE — OB PROVIDER TRIAGE NOTE - NSHPPHYSICALEXAM_GEN_ALL_CORE
Vital Signs Last 24 Hrs  T(C): 36.7 (02 Jun 2019 22:32), Max: 36.7 (02 Jun 2019 22:32)  T(F): 98 (02 Jun 2019 22:32), Max: 98 (02 Jun 2019 22:32)  HR: 111 (02 Jun 2019 22:32) (111 - 111)  BP: 136/88 (02 Jun 2019 22:32) (136/88 - 136/88)  RR: 18 (02 Jun 2019 22:32) (18 - 18)    Udip: neg  EFM: 140/mod/accel+  [place]: ocasional  SVE: deferred  Abd: NT, gravid, no palpable contractions  Bedside sono: Vital Signs Last 24 Hrs  T(C): 36.7 (02 Jun 2019 22:32), Max: 36.7 (02 Jun 2019 22:32)  T(F): 98 (02 Jun 2019 22:32), Max: 98 (02 Jun 2019 22:32)  HR: 111 (02 Jun 2019 22:32) (111 - 111)  BP: 136/88 (02 Jun 2019 22:32) (136/88 - 136/88)  RR: 18 (02 Jun 2019 22:32) (18 - 18)    Udip: neg  EFM: 140/mod/accel+  [place]: occasional  SVE: deferred  Abd: NT, gravid, no palpable contractions  Bedside sono: BPP 8/8, MVP 2.73 cm,  bpm, cephalic, anterior placenta Vital Signs Last 24 Hrs  T(C): 36.7 (02 Jun 2019 22:32), Max: 36.7 (02 Jun 2019 22:32)  T(F): 98 (02 Jun 2019 22:32), Max: 98 (02 Jun 2019 22:32)  HR: 111 (02 Jun 2019 22:32) (111 - 111) --> manual HR 96 bpm  BP: 136/88 (02 Jun 2019 22:32) (136/88 - 136/88)  RR: 18 (02 Jun 2019 22:32) (18 - 18)    Udip: neg  EFM: 140/mod/accel+  [place]: occasional  SVE: deferred  Abd: NT, gravid, no palpable contractions  Bedside sono: BPP 8/8, MVP 2.73 cm,  bpm, cephalic, anterior placenta Vital Signs Last 24 Hrs  T(C): 36.7 (02 Jun 2019 22:32), Max: 36.7 (02 Jun 2019 22:32)  T(F): 98 (02 Jun 2019 22:32), Max: 98 (02 Jun 2019 22:32)  HR: 111 (02 Jun 2019 22:32) (111 - 111) --> manual HR 96 bpm  BP: 136/88 (02 Jun 2019 22:32) (136/88 - 136/88)  RR: 18 (02 Jun 2019 22:32) (18 - 18)    Udip: neg  EFM: 140/mod/accel+  [place]: occasional  SVE: deferred  Abd: NT, gravid, no palpable contractions  Bedside sono: BPP 8/8, MVP 2.73 cm,  bpm, cephalic, anterior placenta, EFW 2048 grams (6.1% - Kamlesh), umbilical artery doppler studies wnl (2.13, 1.95 and 2.57)

## 2019-06-02 NOTE — OB PROVIDER TRIAGE NOTE - NS_SONONOTE_OBGYN_ALL_OB_FT
BPP 8/8, ,  bpm BPP 8/8, ,  bpm, EFW 6.1% - Kamlesh, umbilical artery doppler studies wnl (2.13, 1.95 and 2.57)

## 2019-06-02 NOTE — OB PROVIDER TRIAGE NOTE - NS_OBGYNHISTORY_OBGYN_ALL_OB_FT
OB Hx: FT  x2, largest 7-6lbs, no complications  GYN Hx: denies h/o fibroids, ovarian cysts, abnormal paps and STIs

## 2019-06-06 ENCOUNTER — NON-APPOINTMENT (OUTPATIENT)
Age: 34
End: 2019-06-06

## 2019-06-06 ENCOUNTER — APPOINTMENT (OUTPATIENT)
Dept: OBGYN | Facility: CLINIC | Age: 34
End: 2019-06-06
Payer: COMMERCIAL

## 2019-06-06 VITALS — HEIGHT: 66 IN | BODY MASS INDEX: 24.43 KG/M2 | WEIGHT: 152 LBS

## 2019-06-06 LAB
BILIRUB UR QL STRIP: NORMAL
CLARITY UR: CLEAR
GLUCOSE UR-MCNC: NORMAL
HCG UR QL: NORMAL EU/DL
HGB UR QL STRIP.AUTO: NORMAL
KETONES UR-MCNC: NORMAL
LEUKOCYTE ESTERASE UR QL STRIP: 25
NITRITE UR QL STRIP: NORMAL
PH UR STRIP: 6.5
PROT UR STRIP-MCNC: NORMAL
SP GR UR STRIP: 1.01

## 2019-06-06 PROCEDURE — 0502F SUBSEQUENT PRENATAL CARE: CPT

## 2019-06-07 PROBLEM — Z78.9 OTHER SPECIFIED HEALTH STATUS: Chronic | Status: ACTIVE | Noted: 2019-06-02

## 2019-06-12 ENCOUNTER — NON-APPOINTMENT (OUTPATIENT)
Age: 34
End: 2019-06-12

## 2019-06-12 ENCOUNTER — APPOINTMENT (OUTPATIENT)
Dept: OBGYN | Facility: CLINIC | Age: 34
End: 2019-06-12
Payer: COMMERCIAL

## 2019-06-12 VITALS — WEIGHT: 155 LBS | HEIGHT: 66 IN | BODY MASS INDEX: 24.91 KG/M2

## 2019-06-12 LAB
BILIRUB UR QL STRIP: NORMAL
CLARITY UR: CLEAR
GLUCOSE UR-MCNC: NORMAL
HCG UR QL: NORMAL EU/DL
HGB UR QL STRIP.AUTO: NORMAL
KETONES UR-MCNC: NORMAL
LEUKOCYTE ESTERASE UR QL STRIP: NORMAL
NITRITE UR QL STRIP: NORMAL
PH UR STRIP: 5
PROT UR STRIP-MCNC: NORMAL
SP GR UR STRIP: 1

## 2019-06-12 PROCEDURE — 0502F SUBSEQUENT PRENATAL CARE: CPT

## 2019-06-14 ENCOUNTER — NON-APPOINTMENT (OUTPATIENT)
Age: 34
End: 2019-06-14

## 2019-06-17 ENCOUNTER — INPATIENT (INPATIENT)
Facility: HOSPITAL | Age: 34
LOS: 4 days | Discharge: HOME | End: 2019-06-22
Attending: OBSTETRICS & GYNECOLOGY | Admitting: OBSTETRICS & GYNECOLOGY
Payer: COMMERCIAL

## 2019-06-17 ENCOUNTER — OUTPATIENT (OUTPATIENT)
Dept: OUTPATIENT SERVICES | Facility: HOSPITAL | Age: 34
LOS: 1 days | Discharge: HOME | End: 2019-06-17

## 2019-06-17 ENCOUNTER — RESULT REVIEW (OUTPATIENT)
Age: 34
End: 2019-06-17

## 2019-06-17 VITALS
HEIGHT: 66 IN | WEIGHT: 154.1 LBS | DIASTOLIC BLOOD PRESSURE: 81 MMHG | RESPIRATION RATE: 19 BRPM | TEMPERATURE: 99 F | SYSTOLIC BLOOD PRESSURE: 124 MMHG | HEART RATE: 110 BPM

## 2019-06-17 VITALS — HEART RATE: 99 BPM | SYSTOLIC BLOOD PRESSURE: 126 MMHG | DIASTOLIC BLOOD PRESSURE: 72 MMHG

## 2019-06-17 VITALS — TEMPERATURE: 99 F

## 2019-06-17 DIAGNOSIS — Z98.890 OTHER SPECIFIED POSTPROCEDURAL STATES: Chronic | ICD-10-CM

## 2019-06-17 LAB
APPEARANCE UR: ABNORMAL
BACTERIA # UR AUTO: ABNORMAL /HPF
BASOPHILS # BLD AUTO: 0.02 K/UL — SIGNIFICANT CHANGE UP (ref 0–0.2)
BASOPHILS NFR BLD AUTO: 0.2 % — SIGNIFICANT CHANGE UP (ref 0–1)
BILIRUB UR-MCNC: NEGATIVE — SIGNIFICANT CHANGE UP
BLD GP AB SCN SERPL QL: SIGNIFICANT CHANGE UP
COLOR SPEC: YELLOW — SIGNIFICANT CHANGE UP
DIFF PNL FLD: NEGATIVE — SIGNIFICANT CHANGE UP
EOSINOPHIL # BLD AUTO: 0.2 K/UL — SIGNIFICANT CHANGE UP (ref 0–0.7)
EOSINOPHIL NFR BLD AUTO: 2.1 % — SIGNIFICANT CHANGE UP (ref 0–8)
EPI CELLS # UR: ABNORMAL /HPF
GLUCOSE UR QL: NEGATIVE MG/DL — SIGNIFICANT CHANGE UP
HCT VFR BLD CALC: 36.7 % — LOW (ref 37–47)
HGB BLD-MCNC: 11.7 G/DL — LOW (ref 12–16)
IMM GRANULOCYTES NFR BLD AUTO: 0.3 % — SIGNIFICANT CHANGE UP (ref 0.1–0.3)
KETONES UR-MCNC: 15
LEUKOCYTE ESTERASE UR-ACNC: ABNORMAL
LYMPHOCYTES # BLD AUTO: 1.47 K/UL — SIGNIFICANT CHANGE UP (ref 1.2–3.4)
LYMPHOCYTES # BLD AUTO: 15.2 % — LOW (ref 20.5–51.1)
MCHC RBC-ENTMCNC: 26.1 PG — LOW (ref 27–31)
MCHC RBC-ENTMCNC: 31.9 G/DL — LOW (ref 32–37)
MCV RBC AUTO: 81.7 FL — SIGNIFICANT CHANGE UP (ref 81–99)
MONOCYTES # BLD AUTO: 0.53 K/UL — SIGNIFICANT CHANGE UP (ref 0.1–0.6)
MONOCYTES NFR BLD AUTO: 5.5 % — SIGNIFICANT CHANGE UP (ref 1.7–9.3)
NEUTROPHILS # BLD AUTO: 7.43 K/UL — HIGH (ref 1.4–6.5)
NEUTROPHILS NFR BLD AUTO: 76.7 % — HIGH (ref 42.2–75.2)
NITRITE UR-MCNC: NEGATIVE — SIGNIFICANT CHANGE UP
NRBC # BLD: 0 /100 WBCS — SIGNIFICANT CHANGE UP (ref 0–0)
PH UR: 6 — SIGNIFICANT CHANGE UP (ref 5–8)
PLATELET # BLD AUTO: 203 K/UL — SIGNIFICANT CHANGE UP (ref 130–400)
PRENATAL SYPHILIS TEST: SIGNIFICANT CHANGE UP
PROT UR-MCNC: NEGATIVE MG/DL — SIGNIFICANT CHANGE UP
RBC # BLD: 4.49 M/UL — SIGNIFICANT CHANGE UP (ref 4.2–5.4)
RBC # FLD: 13.5 % — SIGNIFICANT CHANGE UP (ref 11.5–14.5)
SP GR SPEC: 1.01 — SIGNIFICANT CHANGE UP (ref 1.01–1.03)
UROBILINOGEN FLD QL: 0.2 MG/DL — SIGNIFICANT CHANGE UP (ref 0.2–0.2)
WBC # BLD: 9.68 K/UL — SIGNIFICANT CHANGE UP (ref 4.8–10.8)
WBC # FLD AUTO: 9.68 K/UL — SIGNIFICANT CHANGE UP (ref 4.8–10.8)
WBC UR QL: SIGNIFICANT CHANGE UP /HPF

## 2019-06-17 PROCEDURE — 59400 OBSTETRICAL CARE: CPT

## 2019-06-17 PROCEDURE — S2140: CPT

## 2019-06-17 RX ORDER — OXYTOCIN 10 UNIT/ML
41.67 VIAL (ML) INJECTION
Qty: 20 | Refills: 0 | Status: DISCONTINUED | OUTPATIENT
Start: 2019-06-17 | End: 2019-06-18

## 2019-06-17 RX ORDER — OXYTOCIN 10 UNIT/ML
2 VIAL (ML) INJECTION
Qty: 30 | Refills: 0 | Status: DISCONTINUED | OUTPATIENT
Start: 2019-06-17 | End: 2019-06-18

## 2019-06-17 RX ORDER — SODIUM CHLORIDE 9 MG/ML
1000 INJECTION, SOLUTION INTRAVENOUS
Refills: 0 | Status: DISCONTINUED | OUTPATIENT
Start: 2019-06-17 | End: 2019-06-18

## 2019-06-17 RX ADMIN — Medication 100 MILLIGRAM(S): at 20:50

## 2019-06-17 NOTE — OB PROVIDER H&P - NSHPPHYSICALEXAM_GEN_ALL_CORE
Physical exam:    Vital Signs Last 24 Hrs  T(F): 98.9 (17 Jun 2019 20:00), Max: 99 (17 Jun 2019 15:30)  HR: 98 (17 Jun 2019 21:22) (97 - 111)  BP: 121/73 (17 Jun 2019 21:22) (121/73 - 140/84)  RR: 19 (17 Jun 2019 20:00) (18 - 19)  Gen: NAD  Abdomen: Soft, nontender, no palpable contractions.  VE: 1.5/50/-3 vertex, intact as per Dr. Swartz  EFM: 120/mod/+accels  toco: no contractions

## 2019-06-17 NOTE — OB PROVIDER TRIAGE NOTE - NSOBPROVIDERNOTE_OBGYN_ALL_OB_FT
32yo  at 37w6d, GBS positive, for repeat NST and BPP, complicated by  CMV infection, umbilical cord varix, and IUGR, for possible IOL. 34yo  at 37w6d, GBS positive, BPP 8/8 with reassuring maternal status, complicated by  CMV infection, umbilical cord varix, and IUGR, for possible IOL. 32yo  at 37w6d, GBS positive, BPP 8/8 with reassuring fetal and maternal status, complicated by  CMV infection, umbilical cord varix, and IUGR, and an isolated elevated blood pressure on arrival, blood pressures WNL since, to follow up with Dr. Leblanc on .  -labor precautions  -[facility] encouraged  -No labs or BP monitoring at this time per Dr. Leblanc   -Follow up with PMD    Dr. Beard and Dr. Leblanc aware

## 2019-06-17 NOTE — OB PROVIDER H&P - ASSESSMENT
32yo  at 37w6d, GBS positive, with IUGR <3%, normal dopplers, with elevated CMV igM titers, with fetal umbilical vein varices, and transient fetal pericardia effusion, for IOL    -Admit to labor and delivery  -IV hydration and clear liquid diet  -Cont efm and toco  -Induction with pitocin  -Pain management PRN  -Clindamycin for GBS ppx      Dr. Swartz and Dr. Leblanc aware

## 2019-06-17 NOTE — CHART NOTE - NSCHARTNOTEFT_GEN_A_CORE
Monday 06/17/2019  21:41     Patient's chart, notes and labs reviewed.                        11.7   9.68  )-----------( 203      ( 17 Jun 2019 20:21 )             36.7   Lab results acceptable for consideration of Neuraxial Nerve Block for Labor Analgesia/Anesthesia.  Will remain immediately available.

## 2019-06-17 NOTE — OB PROVIDER TRIAGE NOTE - HISTORY OF PRESENT ILLNESS
34yo  at 37w6d, dated by LMP confirmed with first trimester songram, presents today secondary to decreased movement during ultrasound in Dr. Wilder's office. She currently reports good fetal movement, denies ctx, vaginal bleeding and LOF. Pregnancy complicated by CMV infection; Per MFM Note, CMV IgM positive 1.42, IgG negative. This was repeated on 19 with PCR, IgM elevated at 3, IgG negative, PCR <34.5 which is normal. Also complicated by umbilical cord/ intraabdominal vein Varix (11mm) on fetal MRI, with IUGR at <10% on last sonogram. Previously complicated by fetal pericadial effusion, now resolved. Pt last saw Dr. Leblanc on , and has plans for induction at 38w. GBS positive. 34yo  at 37w6d, dated by LMP confirmed with first trimester songram, presents today secondary to decreased movement during ultrasound in Dr. Wilder's office. She currently reports good fetal movement, denies ctx, vaginal bleeding and LOF. Pregnancy complicated by CMV infection; Per MFM Note, CMV IgM positive 1.42, IgG negative. This was repeated on 19 with PCR, IgM elevated at 3, IgG negative, PCR <34.5 which is normal. Also complicated by umbilical cord/ intraabdominal vein Varix (11mm) on fetal MRI, with IUGR at <10% on last sonogram. Previously complicated by fetal pericadial effusion, now resolved. Pt last saw Dr. Leblanc on , 1 cm, and has plans for induction after 38w. GBS positive.

## 2019-06-17 NOTE — OB PROVIDER TRIAGE NOTE - ADDITIONAL INSTRUCTIONS
If you have contractions every few minutes, vaginal bleeding, leakage of fluid or you don't feel the baby move please call your doctor or come to the emergency department.

## 2019-06-17 NOTE — OB PROVIDER IHI INDUCTION/AUGMENTATION NOTE - NS_CHECKALL_OBGYN_ALL_OB
Order was written/FHR was reviewed/Contractions pattern was reviewed/H&P was completed/Induction / Augmentation was discussed

## 2019-06-17 NOTE — OB PROVIDER TRIAGE NOTE - NSHPLABSRESULTS_GEN_ALL_CORE
4/2/19: CMV IgG neg, CMV IgM high (1.42)  4/16/19: CMV IgG neg, CMV IgM high (1.33)  Sequential low risk, refused amniocentesis   NIPT low risk  GCT 66    5/30/2019:  GBS pos    5/14/2019:  Hep B neg  RPR neg  HIV neg  Measles immune    11/28/2019:  A pos, no antibodies detected  Rubella/Varicella immune    Sonos:  36w6d: 2154g, <10%, cephalic, 3vc, MVP 45mm, anterior palcenta BPP 10/10  36w1d: cephalic, 3vc, MVP 42mm, BPP 8/8, anterior  placenta   35w6d: cephalic, 3vc, bpp 8/8, anterior placenta   34w: cephalic, 3vc, anterior placenta, SANGITA 142 mm, MVP 40 mm, BPP 8/8, doppler studies wnl   33w: EFW 1624 grams (18%),  bpm, cephalic, 3vc, anterior placenta, MVP 57 mm, anatomy wnl except ventral wall with enlarged umbilical cord and liver with umbilical vein varix, BPP 8/8, doppler studies wnl  32w: cephalic, 3vc, anterior placenta, SANGITA 137 mm, MVP 53 mm, BPP 8/8, doppler studies wnl, fetal heart evaluation revealed mild pericardial effusion per pediatric cardiologist  31w: 1309 grams (24%),  bpm, cephalic, 3vc, anterior placenta, MVP 64 mm, anatomy wnl except ventral wall with enlarged umbilical cord and liver with umbilical vein varix, BPP 8/8, doppler studies wnl   30w: cephalic, 3vc, anterior placenta, MVP 44 mm, SANGITA wnl, BPP 8/8, doppler studies wnl  29w: EFW 1090 grams (32%), transverse,  bpm, 3vc, anterior placenta, MVP 48 mm, anatomy wnl except ventral wall with enlarged umbilical cord and liver with umbilical vein varix and pericardial effusion, doppler studies wnl  28w:  grams (27%),  bpm, cephalic, abnormal dilated fetal insertion, anterior placenta, MVP 47 mm, anatomy wnl except ventral wall with enlarged umbilical cord and liver with umbilical vein varix and pericardial effusion, umbilical cord dilated at fetal insertion, BPP 8/8, doppler studies wnl, fetal MRI revealed intraabdominal vein varix measuring up to approximately 11 mm and the fronto-occipital diameter measurement is at the lower limits of normal for GA, remaining intracranial measurements are normal for GA  27w1d:  grams (32%),  bpm, cephalic, 3vc, anterior placenta, MVP 53 mm, anatomy wnl, doppler studies wnl, pericardial effusion noted measuring 3 mm, isolated umbilical cord varix noted measuring 11 mm  27w:  grams (26%), transverse, abnormal - dilated at fetal insertion, MVP 47 mm, anatomy wnl except for ventral wall with enlarged umbilical cord and liver with umbilical vein varix  23w6d: vertex, fundal placenta, SANGITA wnl, amniotic wnl, BPP 8/8  22w: AC and HC lagging behind 1w2d, amniotic fluid wnl, anterior placenta, EFW 15 ounces, doppler studies wnl   20w2d: EFW 9 ounces, transverse, anterior placenta, amniotic fluid wnl,  bpm, size not equal to dates, no gross morphological abnormalities noted  12w2d: CL 31 mm, nuchal wnl

## 2019-06-17 NOTE — OB PROVIDER H&P - HISTORY OF PRESENT ILLNESS
32 yo  at 37w6d w/ DANIEL of 2019 by LMP c/w 1st trimester sonogram here for IOL for ___.  At around 34w pt was diagnosed with IUGR and umbilical vein varix since 27w of 11mm. Denies contractions, LOF and VB. Last recorded EFW 1624 grams (18%) at 33w and last recorded doppler studies wnl at 34w. No other complications in this pregnancy. Fetal MRI revealed intraabdominal vein varix measuring up to approximately 11 mm and the fronto-occipital diameter measurement is at the lower limits of normal for GA, remaining intracranial measurements are normal for GA. Fetal heart evaluation revealed mild pericardial effusion per pediatric cardiologist. Last PMD visit was , VE was done, cervix was . 34 yo  at 37w6d w/ DANIEL of 2019 by LMP c/w 1st trimester sonogram here for IOL for IUGR <3%. Patient was seen today at [facility] Dr. Pollack with decreased movement on BPP so was sent to labor and delivery earlier in the day for NST and BPP. At the time, BPP was 10/10. Decision for induction was taken later in the day by own MFM, Dr. Pollack. Today, patient denies contractions, leakage of fluid, vaginal bleeding. Reports good fetal movement.  Fetal IUGR was diagnosed around 35w and was found to have umbilical vein varix since 27w of 11mm. Denies contractions, LOF and VB. Last recorded EFW 1624 grams (18%) at 33w and last recorded doppler studies wnl at 34w. No other complications in this pregnancy. Fetal MRI revealed intraabdominal vein varix measuring up to approximately 11 mm and the fronto-occipital diameter measurement is at the lower limits of normal for GA, remaining intracranial measurements are normal for GA. Fetal heart evaluation revealed mild pericardial effusion per pediatric cardiologist that had resolved. GBS positive. Allergic to PCN (anaphylaxis)

## 2019-06-17 NOTE — OB PROVIDER H&P - NSHPLABSRESULTS_GEN_ALL_CORE
4/2/19: CMV IgG neg, CMV IgM high (1.42)  4/16/19: CMV IgG neg, CMV IgM high (1.33)  Sequential low risk, refused amniocentesis   NIPT low risk  GCT 66    5/14/2019:  Measles immune    Sonos:  34w: cephalic, 3vc, anterior placenta, SANGITA 142 mm, MVP 40 mm, BPP 8/8, doppler studies wnl   33w: EFW 1624 grams (18%),  bpm, cephalic, 3vc, anterior placenta, MVP 57 mm, anatomy wnl except ventral wall with enlarged umbilical cord and liver with umbilical vein varix, BPP 8/8, doppler studies wnl  32w: cephalic, 3vc, anterior placenta, SANGITA 137 mm, MVP 53 mm, BPP 8/8, doppler studies wnl, fetal heart evaluation revealed mild pericardial effusion per pediatric cardiologist  31w: 1309 grams (24%),  bpm, cephalic, 3vc, anterior placenta, MVP 64 mm, anatomy wnl except ventral wall with enlarged umbilical cord and liver with umbilical vein varix, BPP 8/8, doppler studies wnl   30w: cephalic, 3vc, anterior placenta, MVP 44 mm, SANGITA wnl, BPP 8/8, doppler studies wnl  29w: EFW 1090 grams (32%), transverse,  bpm, 3vc, anterior placenta, MVP 48 mm, anatomy wnl except ventral wall with enlarged umbilical cord and liver with umbilical vein varix and pericardial effusion, doppler studies wnl  28w:  grams (27%),  bpm, cephalic, abnormal dilated fetal insertion, anterior placenta, MVP 47 mm, anatomy wnl except ventral wall with enlarged umbilical cord and liver with umbilical vein varix and pericardial effusion, umbilical cord dilated at fetal insertion, BPP 8/8, doppler studies wnl, fetal MRI revealed intraabdominal vein varix measuring up to approximately 11 mm and the fronto-occipital diameter measurement is at the lower limits of normal for GA, remaining intracranial measurements are normal for GA  27w1d:  grams (32%),  bpm, cephalic, 3vc, anterior placenta, MVP 53 mm, anatomy wnl, doppler studies wnl, pericardial effusion noted measuring 3 mm, isolated umbilical cord varix noted measuring 11 mm  27w:  grams (26%), transverse, abnormal - dilated at fetal insertion, MVP 47 mm, anatomy wnl except for ventral wall with enlarged umbilical cord and liver with umbilical vein varix  23w6d: vertex, fundal placenta, SANGITA wnl, amniotic wnl, BPP 8/8  20w2d: EFW 9 ounces, transverse, anterior placenta, amniotic fluid wnl,  bpm, size not equal to dates, no gross morphological abnormalities noted  12w2d: CL 31 mm, nuchal wnl 4/2/19: CMV IgG neg, CMV IgM high (1.42)  4/16/19: CMV IgG neg, CMV IgM high (1.33)  Sequential low risk, refused amniocentesis   NIPT low risk  GCT 66    5/14/2019:  Measles immune    Sonos:  37.1w- cephalic, 3vc, ant placenta, CL 35mm, BPP 10/10, MVP 68mm, S/D 1.7, MCAdopplers 2cm/s   34w: cephalic, 3vc, anterior placenta, SANGITA 142 mm, MVP 40 mm, BPP 8/8, doppler studies wnl   33w: EFW 1624 grams (18%),  bpm, cephalic, 3vc, anterior placenta, MVP 57 mm, anatomy wnl except ventral wall with enlarged umbilical cord and liver with umbilical vein varix, BPP 8/8, doppler studies wnl  32w: cephalic, 3vc, anterior placenta, SANGITA 137 mm, MVP 53 mm, BPP 8/8, doppler studies wnl, fetal heart evaluation revealed mild pericardial effusion per pediatric cardiologist  31w: 1309 grams (24%),  bpm, cephalic, 3vc, anterior placenta, MVP 64 mm, anatomy wnl except ventral wall with enlarged umbilical cord and liver with umbilical vein varix, BPP 8/8, doppler studies wnl   30w: cephalic, 3vc, anterior placenta, MVP 44 mm, SANGITA wnl, BPP 8/8, doppler studies wnl  29w: EFW 1090 grams (32%), transverse,  bpm, 3vc, anterior placenta, MVP 48 mm, anatomy wnl except ventral wall with enlarged umbilical cord and liver with umbilical vein varix and pericardial effusion, doppler studies wnl  28w:  grams (27%),  bpm, cephalic, abnormal dilated fetal insertion, anterior placenta, MVP 47 mm, anatomy wnl except ventral wall with enlarged umbilical cord and liver with umbilical vein varix and pericardial effusion, umbilical cord dilated at fetal insertion, BPP 8/8, doppler studies wnl, fetal MRI revealed intraabdominal vein varix measuring up to approximately 11 mm and the fronto-occipital diameter measurement is at the lower limits of normal for GA, remaining intracranial measurements are normal for GA  27w1d:  grams (32%),  bpm, cephalic, 3vc, anterior placenta, MVP 53 mm, anatomy wnl, doppler studies wnl, pericardial effusion noted measuring 3 mm, isolated umbilical cord varix noted measuring 11 mm  27w:  grams (26%), transverse, abnormal - dilated at fetal insertion, MVP 47 mm, anatomy wnl except for ventral wall with enlarged umbilical cord and liver with umbilical vein varix  23w6d: vertex, fundal placenta, SANGITA wnl, amniotic wnl, BPP 8/8  20w2d: EFW 9 ounces, transverse, anterior placenta, amniotic fluid wnl,  bpm, size not equal to dates, no gross morphological abnormalities noted  12w2d: CL 31 mm, nuchal wnl

## 2019-06-17 NOTE — OB PROVIDER TRIAGE NOTE - NS_OBGYNHISTORY_OBGYN_ALL_OB_FT
OB: NSVDX2, largest baby 7-6, denies SAB/ ETOP. No complications.   Gyn: Denies history of abnormal papsmears, STIs, uterine fibroids or ovarian cysts. OB: NSVDX2, full term, largest baby 7-6, denies SAB/ ETOP. No complications.   Gyn: Denies history of abnormal papsmears, STIs, uterine fibroids or ovarian cysts.

## 2019-06-17 NOTE — OB PROVIDER TRIAGE NOTE - NSHPPHYSICALEXAM_GEN_ALL_CORE
Vital Signs Last 24 Hrs  T(C): 37.2 (17 Jun 2019 15:33), Max: 37.2 (17 Jun 2019 15:30)  T(F): 99 (17 Jun 2019 15:33), Max: 99 (17 Jun 2019 15:30)  HR: 103 (17 Jun 2019 16:02) (101 - 109)  BP: 131/85 (17 Jun 2019 16:02) (131/85 - 140/84)  RR: 18 (17 Jun 2019 15:33) (18 - 18)    Gen: A+OX3. NAD.   Abd: Soft, gravid. nontender.   FHR: 125bpm/mod roman/ accels+  TOCO occasional   SVE deferred     UDIP negative Vital Signs Last 24 Hrs  T(C): 37.2 (17 Jun 2019 15:33), Max: 37.2 (17 Jun 2019 15:30)  T(F): 99 (17 Jun 2019 15:33), Max: 99 (17 Jun 2019 15:30)  HR: 103 (17 Jun 2019 16:02) (101 - 109)  BP: 131/85 (17 Jun 2019 16:02) (131/85 - 140/84)  RR: 18 (17 Jun 2019 15:33) (18 - 18)    Gen: A+OX3. NAD.   Abd: Soft, gravid. nontender.   FHR: 125bpm/mod roman/ accels+  TOCO occasional   SVE 1/long/-2, vertex, intact     UDIP negative

## 2019-06-18 ENCOUNTER — APPOINTMENT (OUTPATIENT)
Dept: OBGYN | Facility: CLINIC | Age: 34
End: 2019-06-18

## 2019-06-18 DIAGNOSIS — Z02.9 ENCOUNTER FOR ADMINISTRATIVE EXAMINATIONS, UNSPECIFIED: ICD-10-CM

## 2019-06-18 LAB
AMPHET UR-MCNC: NEGATIVE — SIGNIFICANT CHANGE UP
BARBITURATES UR SCN-MCNC: NEGATIVE — SIGNIFICANT CHANGE UP
BASOPHILS # BLD AUTO: 0.02 K/UL — SIGNIFICANT CHANGE UP (ref 0–0.2)
BASOPHILS NFR BLD AUTO: 0.2 % — SIGNIFICANT CHANGE UP (ref 0–1)
BENZODIAZ UR-MCNC: NEGATIVE — SIGNIFICANT CHANGE UP
BUPRENORPHINE SCREEN, URINE RESULT: NEGATIVE — SIGNIFICANT CHANGE UP
COCAINE METAB.OTHER UR-MCNC: NEGATIVE — SIGNIFICANT CHANGE UP
EOSINOPHIL # BLD AUTO: 0.25 K/UL — SIGNIFICANT CHANGE UP (ref 0–0.7)
EOSINOPHIL NFR BLD AUTO: 3 % — SIGNIFICANT CHANGE UP (ref 0–8)
HCT VFR BLD CALC: 32.4 % — LOW (ref 37–47)
HGB BLD-MCNC: 10.2 G/DL — LOW (ref 12–16)
IMM GRANULOCYTES NFR BLD AUTO: 0.4 % — HIGH (ref 0.1–0.3)
L&D DRUG SCREEN, URINE: SIGNIFICANT CHANGE UP
LYMPHOCYTES # BLD AUTO: 1.61 K/UL — SIGNIFICANT CHANGE UP (ref 1.2–3.4)
LYMPHOCYTES # BLD AUTO: 19.3 % — LOW (ref 20.5–51.1)
MCHC RBC-ENTMCNC: 26.2 PG — LOW (ref 27–31)
MCHC RBC-ENTMCNC: 31.5 G/DL — LOW (ref 32–37)
MCV RBC AUTO: 83.3 FL — SIGNIFICANT CHANGE UP (ref 81–99)
METHADONE UR-MCNC: NEGATIVE — SIGNIFICANT CHANGE UP
MONOCYTES # BLD AUTO: 0.58 K/UL — SIGNIFICANT CHANGE UP (ref 0.1–0.6)
MONOCYTES NFR BLD AUTO: 7 % — SIGNIFICANT CHANGE UP (ref 1.7–9.3)
NEUTROPHILS # BLD AUTO: 5.84 K/UL — SIGNIFICANT CHANGE UP (ref 1.4–6.5)
NEUTROPHILS NFR BLD AUTO: 70.1 % — SIGNIFICANT CHANGE UP (ref 42.2–75.2)
NRBC # BLD: 0 /100 WBCS — SIGNIFICANT CHANGE UP (ref 0–0)
OPIATES UR-MCNC: NEGATIVE — SIGNIFICANT CHANGE UP
OXYCODONE UR-MCNC: NEGATIVE — SIGNIFICANT CHANGE UP
PCP UR-MCNC: NEGATIVE — SIGNIFICANT CHANGE UP
PLATELET # BLD AUTO: 179 K/UL — SIGNIFICANT CHANGE UP (ref 130–400)
PROPOXYPHENE QUALITATIVE URINE RESULT: NEGATIVE — SIGNIFICANT CHANGE UP
RBC # BLD: 3.89 M/UL — LOW (ref 4.2–5.4)
RBC # FLD: 13.8 % — SIGNIFICANT CHANGE UP (ref 11.5–14.5)
WBC # BLD: 8.33 K/UL — SIGNIFICANT CHANGE UP (ref 4.8–10.8)
WBC # FLD AUTO: 8.33 K/UL — SIGNIFICANT CHANGE UP (ref 4.8–10.8)

## 2019-06-18 PROCEDURE — 88307 TISSUE EXAM BY PATHOLOGIST: CPT | Mod: 26

## 2019-06-18 PROCEDURE — 88313 SPECIAL STAINS GROUP 2: CPT | Mod: 26

## 2019-06-18 RX ORDER — DIBUCAINE 1 %
1 OINTMENT (GRAM) RECTAL EVERY 6 HOURS
Refills: 0 | Status: DISCONTINUED | OUTPATIENT
Start: 2019-06-18 | End: 2019-06-22

## 2019-06-18 RX ORDER — LANOLIN
1 OINTMENT (GRAM) TOPICAL EVERY 6 HOURS
Refills: 0 | Status: DISCONTINUED | OUTPATIENT
Start: 2019-06-18 | End: 2019-06-22

## 2019-06-18 RX ORDER — FENTANYL/BUPIVACAINE/NS/PF 2MCG/ML-.1
250 PLASTIC BAG, INJECTION (ML) INJECTION
Refills: 0 | Status: DISCONTINUED | OUTPATIENT
Start: 2019-06-18 | End: 2019-06-18

## 2019-06-18 RX ORDER — HYDROCORTISONE 1 %
1 OINTMENT (GRAM) TOPICAL EVERY 6 HOURS
Refills: 0 | Status: DISCONTINUED | OUTPATIENT
Start: 2019-06-18 | End: 2019-06-22

## 2019-06-18 RX ORDER — OXYTOCIN 10 UNIT/ML
41.67 VIAL (ML) INJECTION
Qty: 20 | Refills: 0 | Status: DISCONTINUED | OUTPATIENT
Start: 2019-06-18 | End: 2019-06-22

## 2019-06-18 RX ORDER — AER TRAVELER 0.5 G/1
1 SOLUTION RECTAL; TOPICAL EVERY 4 HOURS
Refills: 0 | Status: DISCONTINUED | OUTPATIENT
Start: 2019-06-18 | End: 2019-06-22

## 2019-06-18 RX ORDER — BENZOCAINE 10 %
1 GEL (GRAM) MUCOUS MEMBRANE EVERY 6 HOURS
Refills: 0 | Status: DISCONTINUED | OUTPATIENT
Start: 2019-06-18 | End: 2019-06-22

## 2019-06-18 RX ORDER — GLYCERIN ADULT
1 SUPPOSITORY, RECTAL RECTAL AT BEDTIME
Refills: 0 | Status: DISCONTINUED | OUTPATIENT
Start: 2019-06-18 | End: 2019-06-22

## 2019-06-18 RX ORDER — SIMETHICONE 80 MG/1
80 TABLET, CHEWABLE ORAL EVERY 4 HOURS
Refills: 0 | Status: DISCONTINUED | OUTPATIENT
Start: 2019-06-18 | End: 2019-06-22

## 2019-06-18 RX ORDER — ONDANSETRON 8 MG/1
4 TABLET, FILM COATED ORAL EVERY 6 HOURS
Refills: 0 | Status: DISCONTINUED | OUTPATIENT
Start: 2019-06-18 | End: 2019-06-18

## 2019-06-18 RX ORDER — NALOXONE HYDROCHLORIDE 4 MG/.1ML
0.1 SPRAY NASAL
Refills: 0 | Status: DISCONTINUED | OUTPATIENT
Start: 2019-06-18 | End: 2019-06-18

## 2019-06-18 RX ORDER — DOCUSATE SODIUM 100 MG
100 CAPSULE ORAL
Refills: 0 | Status: DISCONTINUED | OUTPATIENT
Start: 2019-06-18 | End: 2019-06-22

## 2019-06-18 RX ORDER — DIPHENHYDRAMINE HCL 50 MG
25 CAPSULE ORAL EVERY 6 HOURS
Refills: 0 | Status: DISCONTINUED | OUTPATIENT
Start: 2019-06-18 | End: 2019-06-22

## 2019-06-18 RX ORDER — MAGNESIUM HYDROXIDE 400 MG/1
30 TABLET, CHEWABLE ORAL
Refills: 0 | Status: DISCONTINUED | OUTPATIENT
Start: 2019-06-18 | End: 2019-06-22

## 2019-06-18 NOTE — OB RN DELIVERY SUMMARY - NSSKINTOSKINA_OBGYN_ALL_OB_DIFF_HHMM
Problem: Patient Care Overview  Goal: Plan of Care Review  Outcome: Ongoing (interventions implemented as appropriate)   07/14/18 3959   Coping/Psychosocial   Plan of Care Reviewed With patient   Plan of Care Review   Progress improving   OTHER   Outcome Summary continue nebulizer, encourage deep breathe and cough       Problem: Cardiac: Heart Failure (Adult)  Goal: Signs and Symptoms of Listed Potential Problems Will be Absent, Minimized or Managed (Cardiac: Heart Failure)  Outcome: Ongoing (interventions implemented as appropriate)         1 Hour(s) 3 Minute(s)

## 2019-06-18 NOTE — PROGRESS NOTE ADULT - SUBJECTIVE AND OBJECTIVE BOX
PGY2 Note    Patient seen at bedside for evaluation s/p epidural. Pain is well controlled. Otherwise, no complaints.    T(F): 98.9 ( @ 20:00), Max: 99 ( @ 15:30)  HR: 114 ( @ 00:14)  BP: 120/62 ( @ 00:14) (111/65 - 140/84) 140/84 @1531 on  (previous visit)  RR: 19 ( @ 20:00)  EFM: 135/mod/+accels/ early decelerations recovering to baseline  TOCO: q 1-3min  SVE: 480/-1 as per Dr. Leblanc    Medications:  clindamycin IVPB: 100 ( @ 20:50)      Labs:                        11.7   9.68  )-----------( 203      ( 2019 20:21 )             36.7           Prenatal Syphilis Test: Nonreact ( @ 20:21)  Antibody Screen: NEG (19 @ 20:21)    Urinalysis Basic - ( 2019 23:00 )    Color: Yellow / Appearance: Cloudy / S.010 / pH: x  Gluc: x / Ketone: 15  / Bili: Negative / Urobili: 0.2 mg/dL   Blood: x / Protein: Negative mg/dL / Nitrite: Negative   Leuk Esterase: Small / RBC: x / WBC 1-2 /HPF   Sq Epi: x / Non Sq Epi: Few /HPF / Bacteria: Few /HPF        UDS pending

## 2019-06-18 NOTE — CHART NOTE - NSCHARTNOTEFT_GEN_A_CORE
PGY2    Patient seen and evaluated at bedside for recurrent variable decelerations. she's repositioned to her back with oxygen mask on and IV hydration.   SVE 9/90/0 as per Dr. Leblanc  EFM: 130/mod/+accels/ recurrent variables  toco: q 3min      A/P33yo  at 38w, GBS positive, with IUGR <3%, normal dopplers, with elevated CMV igM titers, with fetal umbilical vein varices, and transient fetal pericardia effusion, undergoing IOL, s/p AROM, clear @2330, s/p Epidural , with CatII tracing, undergoing resuscitation    -continue resuscitation  -IV hydration and clear liquid diet  -Cont efm and toco  -continue with pitocin per PMD  -Pain management PRN  -Clindamycin for GBS ppx      Dr. Swartz and Dr. Leblanc aware

## 2019-06-18 NOTE — PROGRESS NOTE ADULT - ASSESSMENT
32yo  at 38w, GBS positive, with IUGR <3%, normal dopplers, with elevated CMV igM titers, with fetal umbilical vein varices, and transient fetal pericardia effusion, undergoing IOL, s/p AROM, clear @2330, s/p Epidural , with CatII tracing, undergoing resuscitation    -continue resuscitation  -IV hydration and clear liquid diet  -Cont efm and toco  -continue with pitocin per PMD  -Pain management PRN  -Clindamycin for GBS ppx      Dr. Swartz and Dr. Leblanc aware

## 2019-06-18 NOTE — OB PROVIDER DELIVERY SUMMARY - NSPROVIDERDELIVERYNOTE_OBGYN_ALL_OB_FT
In birthing room. Atraumatic  baby girl over intact perineum. Pediatrics called due to iugr.  Baby suctioned on perineum and after delivery of body, cord clamped x 2 and cut and baby handed to nurse, to mother, and to pediatric personel.  Cord noted to be long and loosely wrapped around body x 1 and very twisted.  cord bloods taken for gases and routine. stem cells and tissue (cord  segment) collected as per protocol and as per patient request. Placenta delivered spontaneously and appears intact. placenta to go to pathology for evaluation due to iugr and possible cmv .  cervix, vagina and perineum checked. no tears noted.   apgars 9-9   weight 4'7".  baby to high risk nursery due to weight for observation., fetal echo, abdominal sono, cmv testing and additional blood work.

## 2019-06-18 NOTE — PROGRESS NOTE ADULT - ASSESSMENT
32yo  at 38w, GBS positive, with IUGR <3%, normal dopplers, with elevated CMV igM titers, with fetal umbilical vein varices, and transient fetal pericardia effusion, undergoing IOL, s/p AROM, clear @2330, s/p Epidural , on pitocin      -IV hydration and clear liquid diet  -Cont efm and toco  -continue with pitocin  -Pain management PRN  -Clindamycin for GBS ppx      Dr. Swartz and Dr. Leblanc aware

## 2019-06-18 NOTE — PROCEDURE NOTE - SUPERVISORY STATEMENT
Epidural infusion:  0.1% Bupivacaine with Fentanyl 2 mcg/ml 9premixed)  Rate:  15 ml/hr  Sensory T8 (Rt = Mid = Lt)  Motor intact, able to flex b/l knees w/o any difficulty.

## 2019-06-18 NOTE — PROGRESS NOTE ADULT - SUBJECTIVE AND OBJECTIVE BOX
PGY2 Note    Patient seen at bedside for evaluation for catII tracing. Patient has no complaints. She is undergoing resuscitation with oxygen mask, IV fluid bolus and repositioning.  Pitocin kept at 16mU/min per     T(F): 98.9 ( @ 20:00), Max: 99 ( @ 15:30)  HR: 126 ( @ 00:36)  BP: 121/81 ( @ 00:36) (111/65 - 140/84)  RR: 19 ( @ 20:00)  EFM: 130/mod/+accels/ recurrent variables  TOCO: q1-2min  SVE: Deferred    Medications:    clindamycin IVPB: 100 ( @ 20:50)      Labs:                        11.7   9.68  )-----------( 203      ( 2019 20:21 )             36.7           Prenatal Syphilis Test: Nonreact ( @ 20:21)  Antibody Screen: NEG (19 @ 20:21)    Urinalysis Basic - ( 2019 23:00 )    Color: Yellow / Appearance: Cloudy / S.010 / pH: x  Gluc: x / Ketone: 15  / Bili: Negative / Urobili: 0.2 mg/dL   Blood: x / Protein: Negative mg/dL / Nitrite: Negative   Leuk Esterase: Small / RBC: x / WBC 1-2 /HPF   Sq Epi: x / Non Sq Epi: Few /HPF / Bacteria: Few /HPF      UDS pending

## 2019-06-19 NOTE — PROGRESS NOTE ADULT - SUBJECTIVE AND OBJECTIVE BOX
Status post  baby girl 4'7"  day #1  Afebrile and vs stable  No specific complaints except for some new foot swelling  Ambulatory, voiding without complaints, nursing without complaints  Tolerating diet.  PE: abdomen soft, uterine fundus firm; lochia light       no calf tenderness    imp: s/p  day #1 doing well  plan : probable discharge tomorrow           continue present therapeutic regimen.

## 2019-06-20 NOTE — PROGRESS NOTE ADULT - SUBJECTIVE AND OBJECTIVE BOX
Status post  baby girl at 38wks after induction for iugr and decreased fm on bpp.  Now day # 2  Afebrile and vs stable  C/O some tenderness in rlq on and off  Was c/o feeling somewhat lightheaded and dizzy this am. now feeling somewhat better  Ambulatory, voiding without complaints; nursing without complaints. tolerating diet.  Pe; abdomen soft, uterus firm and well below umbilicus and non tender       no calf tenderness    Imp: status post  day # 2 doing well except for some dizziness this am  Plan; continued close observation for now. Consider discharge later today or possibly in am nathen.          reviewed discharge instructions with patient and .

## 2019-06-21 ENCOUNTER — TRANSCRIPTION ENCOUNTER (OUTPATIENT)
Age: 34
End: 2019-06-21

## 2019-06-21 VITALS
HEART RATE: 94 BPM | DIASTOLIC BLOOD PRESSURE: 74 MMHG | SYSTOLIC BLOOD PRESSURE: 141 MMHG | RESPIRATION RATE: 18 BRPM | TEMPERATURE: 98 F

## 2019-06-21 NOTE — PROGRESS NOTE ADULT - SUBJECTIVE AND OBJECTIVE BOX
Status post  baby girl day #3  Afebrile and vs stable  Patient feeling much better. no longer with lightheadedness, but feels very "stuffy:" (sinus congestion? possibly related to dizziness previously)  Still complaining of intermittent discomfort rlq ( previous exam completely normal)  Ambulatory, voiding withut complaints, attempting to nurse.  Tolerating diet.  Pe: slight pedal edema noted       no calf tenderness    imp: post partum  doing well  plan; discharge home. all instructions given. tylenol or ibuprofen for pain          return to office 5-6 wks or prn.

## 2019-06-21 NOTE — DISCHARGE NOTE OB - VISION (WITH CORRECTIVE LENSES IF THE PATIENT USUALLY WEARS THEM):
1.  Primary Open Angle Glaucoma OU moderate - IOP above target. Option of SLT vs MIGS discussed. Schedule SLT OD/OS Discussed importance of compliance. Will continue to monitor. PO with me. 2. Pseudophakia OU - IOLs stable. Monitor.
Pseudophakia OU - IOLs stable. Monitor.
Normal vision: sees adequately in most situations; can see medication labels, newsprint

## 2019-06-21 NOTE — DISCHARGE NOTE OB - CARE PROVIDER_API CALL
Roger Leblanc)  Obstetrics and Gynecology  [address] [place] [address]  Phone: [phone]  Fax: [phone]  Follow Up Time:

## 2019-06-21 NOTE — DISCHARGE NOTE OB - PLAN OF CARE
healthy mother and baby Nothing in the vagina for 6 weeks, no tampons, no douching, no swimming pools/tub baths. You may shower.     If you have severe abdominal pain, fever>100.4F, heavy vaginal bleeding, please come to the emergency department or call your doctor.

## 2019-06-21 NOTE — DISCHARGE NOTE OB - HOSPITAL COURSE
Pt had an uncomplicated vaginal delivery. Pt had an uncomplicated postpartum course. Pt is discharged to home in stable condition.

## 2019-06-21 NOTE — DISCHARGE NOTE OB - AVOID DOUCHING OR TAMPONS UNTIL YOUR POSTPARTUM VISIT
Post-Op Assessment Note      CV Status:  Stable    Mental Status:  Alert and awake    Hydration Status:  Euvolemic    PONV Controlled:  Controlled    Airway Patency:  Patent    Post Op Vitals Reviewed: Yes          Staff: Anesthesiologist           BP      Temp      Pulse     Resp      SpO2
Statement Selected

## 2019-06-21 NOTE — DISCHARGE NOTE OB - CARE PLAN
Principal Discharge DX:	Vaginal delivery  Goal:	healthy mother and baby  Assessment and plan of treatment:	Nothing in the vagina for 6 weeks, no tampons, no douching, no swimming pools/tub baths. You may shower.     If you have severe abdominal pain, fever>100.4F, heavy vaginal bleeding, please come to the emergency department or call your doctor.

## 2019-06-21 NOTE — DISCHARGE NOTE OB - PATIENT PORTAL LINK FT
You can access the [facility] Patient Portal, offered by [facility], by registering with the following website: [email or web address] [place]

## 2019-06-25 LAB — SURGICAL PATHOLOGY STUDY: SIGNIFICANT CHANGE UP

## 2019-06-27 DIAGNOSIS — O36.8130 DECREASED FETAL MOVEMENTS, THIRD TRIMESTER, NOT APPLICABLE OR UNSPECIFIED: ICD-10-CM

## 2019-06-27 DIAGNOSIS — Z3A.37 37 WEEKS GESTATION OF PREGNANCY: ICD-10-CM

## 2019-06-27 DIAGNOSIS — O36.5930 MATERNAL CARE FOR OTHER KNOWN OR SUSPECTED POOR FETAL GROWTH, THIRD TRIMESTER, NOT APPLICABLE OR UNSPECIFIED: ICD-10-CM

## 2019-06-27 DIAGNOSIS — I87.8 OTHER SPECIFIED DISORDERS OF VEINS: ICD-10-CM

## 2019-06-27 DIAGNOSIS — Z34.90 ENCOUNTER FOR SUPERVISION OF NORMAL PREGNANCY, UNSPECIFIED, UNSPECIFIED TRIMESTER: ICD-10-CM

## 2019-06-27 DIAGNOSIS — Z88.0 ALLERGY STATUS TO PENICILLIN: ICD-10-CM

## 2019-06-27 DIAGNOSIS — Z88.8 ALLERGY STATUS TO OTHER DRUGS, MEDICAMENTS AND BIOLOGICAL SUBSTANCES STATUS: ICD-10-CM

## 2019-07-29 ENCOUNTER — APPOINTMENT (OUTPATIENT)
Dept: OBGYN | Facility: CLINIC | Age: 34
End: 2019-07-29
Payer: COMMERCIAL

## 2019-07-29 VITALS — BODY MASS INDEX: 22.98 KG/M2 | WEIGHT: 143 LBS | HEIGHT: 66 IN

## 2019-07-29 DIAGNOSIS — Z34.90 ENCOUNTER FOR SUPERVISION OF NORMAL PREGNANCY, UNSPECIFIED, UNSPECIFIED TRIMESTER: ICD-10-CM

## 2019-07-29 LAB
BILIRUB UR QL STRIP: NORMAL
CLARITY UR: CLEAR
GLUCOSE UR-MCNC: NORMAL
HCG UR QL: NORMAL EU/DL
HGB UR QL STRIP.AUTO: NORMAL
KETONES UR-MCNC: NORMAL
LEUKOCYTE ESTERASE UR QL STRIP: 25
NITRITE UR QL STRIP: NORMAL
PH UR STRIP: 5
PROT UR STRIP-MCNC: NORMAL
SP GR UR STRIP: 1.02

## 2019-07-29 PROCEDURE — 0503F POSTPARTUM CARE VISIT: CPT

## 2019-08-07 PROBLEM — Z34.90 CURRENTLY PREGNANT: Status: ACTIVE | Noted: 2019-01-01

## 2019-11-14 ENCOUNTER — LABORATORY RESULT (OUTPATIENT)
Age: 34
End: 2019-11-14

## 2019-11-14 ENCOUNTER — APPOINTMENT (OUTPATIENT)
Dept: OBGYN | Facility: CLINIC | Age: 34
End: 2019-11-14
Payer: COMMERCIAL

## 2019-11-14 ENCOUNTER — OUTPATIENT (OUTPATIENT)
Dept: OUTPATIENT SERVICES | Facility: HOSPITAL | Age: 34
LOS: 1 days | Discharge: HOME | End: 2019-11-14

## 2019-11-14 VITALS — HEIGHT: 66 IN | WEIGHT: 144 LBS | BODY MASS INDEX: 23.14 KG/M2

## 2019-11-14 DIAGNOSIS — Z98.890 OTHER SPECIFIED POSTPROCEDURAL STATES: Chronic | ICD-10-CM

## 2019-11-14 PROCEDURE — 99395 PREV VISIT EST AGE 18-39: CPT

## 2019-11-15 DIAGNOSIS — Z01.419 ENCOUNTER FOR GYNECOLOGICAL EXAMINATION (GENERAL) (ROUTINE) WITHOUT ABNORMAL FINDINGS: ICD-10-CM

## 2019-12-04 ENCOUNTER — OUTPATIENT (OUTPATIENT)
Dept: OUTPATIENT SERVICES | Facility: HOSPITAL | Age: 34
LOS: 1 days | Discharge: HOME | End: 2019-12-04

## 2019-12-04 DIAGNOSIS — Z98.890 OTHER SPECIFIED POSTPROCEDURAL STATES: Chronic | ICD-10-CM

## 2019-12-04 DIAGNOSIS — Z02.9 ENCOUNTER FOR ADMINISTRATIVE EXAMINATIONS, UNSPECIFIED: ICD-10-CM

## 2020-01-27 ENCOUNTER — RX RENEWAL (OUTPATIENT)
Age: 35
End: 2020-01-27

## 2020-01-27 RX ORDER — DOCONEXENT, NIACINAMIDE, .ALPHA.-TOCOPHEROL ACETATE, DL-, CHOLECALCIFEROL, .BETA.-CAROTENE, ASCORBIC ACID, THIAMINE MONONITRATE, RIBOFLAVIN, PYRIDOXINE HYDROCHLORIDE, CYANOCOBALAMIN, IRON, ZINC OXIDE, CUPRIC OXIDE, POTASSIUM IODIDE, MAGNESIUM OXIDE, FOLIC ACID, AND LEVOMEFOLATE CALCIUM 200; 15; 20; 1000; 1100; 30; 1.6; 1.8; 2.5; 12; 29; 25; 2; 150; 20; .4; .6 MG/1; MG/1; [IU]/1; [IU]/1; [IU]/1; MG/1; MG/1; MG/1; MG/1; UG/1; MG/1; MG/1; MG/1; UG/1; MG/1; MG/1; MG/1
29-0.6-0.4-2 CAPSULE, LIQUID FILLED ORAL DAILY
Qty: 90 | Refills: 3 | Status: ACTIVE | COMMUNITY
Start: 2019-01-28 | End: 1900-01-01

## 2021-06-05 NOTE — OB RN PATIENT PROFILE - PROVIDER NOTIFICATION
RN cannot approve Refill Request    RN can NOT refill this medication Protocol failed and NO refill given.       Jo Ruiz, [facility] Triage/Med Refill 1/27/2020    Requested Prescriptions   Pending Prescriptions Disp Refills     clopidogreL (PLAVIX) 75 mg tablet [Pharmacy Med Name: Clopidogrel Bisulfate Oral Tablet 75 MG] 90 tablet 3     Sig: Take 1 tablet (75 mg total) by mouth daily.       Clopidogrel/Prasugrel/Ticagrelor Refill Protocol Failed - 1/27/2020 12:07 PM        Failed - PCP or prescribing provider visit in past 6 months       Last office visit with prescriber/PCP: Visit date not found OR same dept: 7/18/2019 Clau Lazo MD OR same specialty: 7/18/2019 Clau Lazo MD Last physical: Visit date not found Last MTM visit: Visit date not found     Next appt within 3 mo: Visit date not found  Next physical within 3 mo: Visit date not found  Prescriber OR PCP: Clau Lazo MD  Last diagnosis associated with med order: 1. Coronary artery disease involving native coronary artery of native heart without angina pectoris  - clopidogreL (PLAVIX) 75 mg tablet [Pharmacy Med Name: Clopidogrel Bisulfate Oral Tablet 75 MG]; Take 1 tablet (75 mg total) by mouth daily.  Dispense: 30 tablet; Refill: 1    If protocol passes may refill for 6 months if within 3 months of last provider visit (or a total of 9 months).              Passed - Hemoglobin in past 12 months     Hemoglobin   Date Value Ref Range Status   10/07/2019 10.1 (L) 14.0 - 18.0 g/dL Final                 Declines

## 2021-12-29 ENCOUNTER — LABORATORY RESULT (OUTPATIENT)
Age: 36
End: 2021-12-29

## 2021-12-30 ENCOUNTER — APPOINTMENT (OUTPATIENT)
Dept: OBGYN | Facility: CLINIC | Age: 36
End: 2021-12-30
Payer: COMMERCIAL

## 2021-12-30 VITALS — WEIGHT: 138 LBS | BODY MASS INDEX: 22.18 KG/M2 | HEIGHT: 66 IN | TEMPERATURE: 97.9 F

## 2021-12-30 PROCEDURE — 99395 PREV VISIT EST AGE 18-39: CPT

## 2022-01-02 NOTE — DISCUSSION/SUMMARY
[FreeTextEntry1] : 36 YEAR OLD FEMALE LMP 12/14/2021 WITH CYCLES MONTHLY FOR THE LAST 6 MONTHS ( WAS NURSING AND JUST STOPPED 1 WK AGO, PRESENTS FOR WELL WOMAN GYNECOLOGIC EVALUATION AND EXAMINATION. CYCLES REGULAR AND MONTHLY FOR THE LAST 6 MONTHS, LASTING 5-6 DAYS  AND NOT USUALLY HEAVY OR PAINFUL. PATIENT HAS BEEN EXPERIENCING "BAD PMS " SYMPTOMS AT TIMES. NEW HISTORY OF SINUS ISSUES- WHEEZING AND COUGH AND SAW PULMONOLOGIST  TODAY AND WAS TOLD POSSIBLE 'ASTHMA".  ALSO HAS RECENTLY BEEN NOTED BY PCP, DR. GREER TO HAVE A LOW WBC ON TWO OCCASIONS AND HAS BEEN REFERRED TO A HEMATOLOGIST; TO SEE DR. LEY AT [facility] SOON. DID RECEIVED PFIZER COVID VACCINE X 2 AND JUST GOT BOOSTER.\par MEDICATIONS; SINGULAIR; DIMIISTA; PEPCID AC; CALRIDEN; PROBIOTIC\par MEDICATION ALLERGIES; PENICILLIN- ANAPHYLAXIS\par ROS;BMS-NORMAL; NO FAMILY  HISTORY OF COLON CANCER\par         NO URINARY COMPLAINTS\par         SEXUALLY ACTIVE WITHOUT COMPLAINTS- WITHDRAWAL-  GOING FOR VASECTOMY\par BREASTS- DOES BREAST SELF EXAMINATION\par                   NO BASELINE MAMMOGRAPHY YET\par                   NO FAMILY HISTORY OF BREAST CANCER.\par -EXERCISE; - MULTIVITAMINS;; + DAIRY; + VITAMIN D; - TOBACCO OR VAPING\par \par PE;/68; TEMP 97.9; WEIGHT 138 LBS; HEIGHT 5'6"\par      BREASTS; NO MASSES; NO  TENDERNESS TO PALPATION\par      ABDOMEN;SOFT, NO MASSES; NO TENDERNESS TO PALPATION\par     PELVIC; NORMAL EXTERNAL GENITALIA\par                  NORMAL URETHRAL MEATUS\par                  NORMAL CERVIX WITHOUT LESIONS\par                  NORMAL VAGINA WITHOUT LESION\par                  NORMAL 3RD RETROVERTED UTERUS\par                  NO PALPABLE ADNEXAL MASSES\par \par IMP; WELL WOMAN\par        PMS\par        LEUKOPENIA\par \par PLAN; THIN PREP PAP WITH HR HPV TESTING DONE\par            BREAST SELF EXAMINATION STRESSED\par

## 2022-01-13 ENCOUNTER — TRANSCRIPTION ENCOUNTER (OUTPATIENT)
Age: 37
End: 2022-01-13

## 2022-02-22 ENCOUNTER — LABORATORY RESULT (OUTPATIENT)
Age: 37
End: 2022-02-22

## 2022-02-22 ENCOUNTER — OUTPATIENT (OUTPATIENT)
Dept: OUTPATIENT SERVICES | Facility: HOSPITAL | Age: 37
LOS: 1 days | Discharge: HOME | End: 2022-02-22

## 2022-02-22 ENCOUNTER — APPOINTMENT (OUTPATIENT)
Dept: HEMATOLOGY ONCOLOGY | Facility: CLINIC | Age: 37
End: 2022-02-22
Payer: COMMERCIAL

## 2022-02-22 VITALS
DIASTOLIC BLOOD PRESSURE: 91 MMHG | SYSTOLIC BLOOD PRESSURE: 126 MMHG | WEIGHT: 135 LBS | HEART RATE: 91 BPM | BODY MASS INDEX: 21.69 KG/M2 | RESPIRATION RATE: 14 BRPM | OXYGEN SATURATION: 98 % | HEIGHT: 66 IN | TEMPERATURE: 97.8 F

## 2022-02-22 DIAGNOSIS — Z78.9 OTHER SPECIFIED HEALTH STATUS: ICD-10-CM

## 2022-02-22 DIAGNOSIS — Z98.890 OTHER SPECIFIED POSTPROCEDURAL STATES: Chronic | ICD-10-CM

## 2022-02-22 DIAGNOSIS — L94.4 GOTTRON'S PAPULES: ICD-10-CM

## 2022-02-22 DIAGNOSIS — D72.819 DECREASED WHITE BLOOD CELL COUNT, UNSPECIFIED: ICD-10-CM

## 2022-02-22 LAB
HCT VFR BLD CALC: 40.5 %
HGB BLD-MCNC: 13.4 G/DL
MCHC RBC-ENTMCNC: 27.7 PG
MCHC RBC-ENTMCNC: 33.1 G/DL
MCV RBC AUTO: 83.7 FL
PLATELET # BLD AUTO: 201 K/UL
PMV BLD: 11.1 FL
RBC # BLD: 4.84 M/UL
RBC # FLD: 13.3 %
WBC # FLD AUTO: 5.87 K/UL

## 2022-02-22 PROCEDURE — 99214 OFFICE O/P EST MOD 30 MIN: CPT

## 2022-02-22 RX ORDER — FAMOTIDINE 40 MG/1
TABLET, FILM COATED ORAL
Refills: 0 | Status: ACTIVE | COMMUNITY

## 2022-02-22 RX ORDER — DESLORATADINE 5 MG/1
TABLET, FILM COATED ORAL
Refills: 0 | Status: ACTIVE | COMMUNITY

## 2022-02-22 RX ORDER — MONTELUKAST SODIUM 10 MG/1
TABLET, FILM COATED ORAL
Refills: 0 | Status: ACTIVE | COMMUNITY

## 2022-02-23 LAB
CK SERPL-CCNC: 50 U/L
IGA SER QL IEP: 106 MG/DL
IGG SER QL IEP: 1168 MG/DL
IGM SER QL IEP: 198 MG/DL
LDH SERPL-CCNC: 146
RHEUMATOID FACT SER QL: <10 IU/ML

## 2022-02-23 NOTE — CONSULT LETTER
[Consult Closing:] : Thank you very much for allowing me to participate in the care of this patient.  If you have any questions, please do not hesitate to contact me. [DrMarzena  ___] : Dr. CYR Include Z78.9 (Other Specified Conditions Influencing Health Status) As An Associated Diagnosis?: Yes Kenalog Preparation: Kenalog Lot # For Kenalog (Optional): [id number] Total Volume (Ccs): 1 Detail Level: Detailed Treatment Number (Optional): 3 Medical Necessity Clause: This procedure was medically necessary because the lesions that were treated were: Expiration Date For Kenalog (Optional): 03/2022 Administered By (Optional): Siena STARK Concentration Of Kenalog Solution Injected (Mg/Ml): 5.0 Consent: The risks of atrophy, bleeding, scarring and infection were reviewed with the patient. Re-treatment may be necessary. X Size Of Lesion In Cm (Optional): 0

## 2022-02-23 NOTE — HISTORY OF PRESENT ILLNESS
[de-identified] : 36 year old female, [place] Ethnicity with PMH of seasonal allergies was referred by her PMD for low WBC. \par \par Her labs from 12/16/21 show WBC 3.77 Hb 13, Plt 175, MCV 86, ANC 1720, ALC 1290. Ferritin 33.  On review of [facility] her WBC count has been up and down since 2016. She also had labs from 2013 that showed WBC 2.9 Hb 13, Plt 129. She had a viral infection at that time. \par \par She was being evaluated by ENT for nasal polyp removal for c/o sore throat, cough and as a part of the work up she had CXR that showed small pleural effusion. So she underwent CT chest that showed small pericardial and pleural effusion and multiple small shitty mediastinal LNs likely reactive in nature. She has had 3 pregnancies and during that time she was told that she had pericardial effusion by the tech but her MD reviewed it and told her that it was not significant. \par \par Also since her teenage years she gets skin nodules/ papular rash in her elbows knees on and off and saw dermatology and she was told this might be psoriasis / gottrons papules. She does not have that rash now and she never received any treatment for it. \par She does not have any c/o fatigue, fever, chills, weight loss, CAROL, joint pains, palpable LNs, recurrent infections. \par \par Never had a mammogram, colonoscopy. \par Never smoker. No alcohol or recreational drug use.

## 2022-02-23 NOTE — ASSESSMENT
[FreeTextEntry1] : Healthy 36 year old female with Chronic Leukopenia with normal differential. \par \par \par 1. Chronic Leukopenia: varying with normal counts as well: Likely benign/ cyclic ?\par No neutropenia with normal Hb and platelets. \par \par Will check ESR, RF, LILIANA, Sjogren, aldolase, CPK, Anti Elizabeth Ab given her c/o rash and findings of small pericardial/pleural effusion. \par Will also check SPEP, MIRACLE, LDH\par WIll check IgG,M and E given her recurrent sinus infections/ allergies. \par Repeat CBC here is normal with WBC 5.8\par \par Further management after above work up. \par \par Patient is seen and examined and discussed with Dr Madison.

## 2022-02-24 LAB
ALBUMIN MFR SERPL ELPH: 59.2 %
ALBUMIN SERPL-MCNC: 4.4 G/DL
ALBUMIN/GLOB SERPL: 1.4 RATIO
ALPHA1 GLOB MFR SERPL ELPH: 4.1 %
ALPHA1 GLOB SERPL ELPH-MCNC: 0.3 G/DL
ALPHA2 GLOB MFR SERPL ELPH: 9.2 %
ALPHA2 GLOB SERPL ELPH-MCNC: 0.7 G/DL
B-GLOBULIN MFR SERPL ELPH: 10.3 %
B-GLOBULIN SERPL ELPH-MCNC: 0.8 G/DL
ENA JO1 AB SER IA-ACNC: <0.2 AL
ENA SS-A AB SER IA-ACNC: <0.2 AL
ENA SS-B AB SER IA-ACNC: <0.2 AL
GAMMA GLOB FLD ELPH-MCNC: 1.3 G/DL
GAMMA GLOB MFR SERPL ELPH: 17.2 %
INTERPRETATION SERPL IEP-IMP: NORMAL
M PROTEIN SPEC IFE-MCNC: NORMAL
PROT SERPL-MCNC: 7.5 G/DL
PROT SERPL-MCNC: 7.5 G/DL

## 2022-02-25 LAB
ALDOLASE SERPL-CCNC: 5.5 U/L
ANA SER IF-ACNC: NEGATIVE
IGE SER-MCNC: 21 KU/L

## 2022-02-28 DIAGNOSIS — D72.819 DECREASED WHITE BLOOD CELL COUNT, UNSPECIFIED: ICD-10-CM

## 2023-02-07 ENCOUNTER — NON-APPOINTMENT (OUTPATIENT)
Age: 38
End: 2023-02-07

## 2023-02-14 ENCOUNTER — NON-APPOINTMENT (OUTPATIENT)
Age: 38
End: 2023-02-14

## 2023-02-16 ENCOUNTER — APPOINTMENT (OUTPATIENT)
Dept: OBGYN | Facility: CLINIC | Age: 38
End: 2023-02-16
Payer: COMMERCIAL

## 2023-02-16 ENCOUNTER — LABORATORY RESULT (OUTPATIENT)
Age: 38
End: 2023-02-16

## 2023-02-16 VITALS — TEMPERATURE: 98.2 F | WEIGHT: 135 LBS | BODY MASS INDEX: 21.69 KG/M2 | HEIGHT: 66 IN

## 2023-02-16 DIAGNOSIS — O09.521 SUPERVISION OF ELDERLY MULTIGRAVIDA, FIRST TRIMESTER: ICD-10-CM

## 2023-02-16 DIAGNOSIS — Z01.419 ENCOUNTER FOR GYNECOLOGICAL EXAMINATION (GENERAL) (ROUTINE) W/OUT ABNORMAL FINDINGS: ICD-10-CM

## 2023-02-16 PROCEDURE — 76817 TRANSVAGINAL US OBSTETRIC: CPT

## 2023-02-16 PROCEDURE — 99395 PREV VISIT EST AGE 18-39: CPT

## 2023-02-23 PROBLEM — O09.521 MULTIGRAVIDA OF ADVANCED MATERNAL AGE IN FIRST TRIMESTER: Status: ACTIVE | Noted: 2023-02-23

## 2023-02-23 PROBLEM — Z01.419 WELL WOMAN EXAM WITH ROUTINE GYNECOLOGICAL EXAM: Status: ACTIVE | Noted: 2018-11-13

## 2023-03-02 ENCOUNTER — APPOINTMENT (OUTPATIENT)
Dept: OBGYN | Facility: CLINIC | Age: 38
End: 2023-03-02
Payer: COMMERCIAL

## 2023-03-02 VITALS — WEIGHT: 135 LBS | HEIGHT: 66 IN | BODY MASS INDEX: 21.69 KG/M2 | TEMPERATURE: 97.9 F

## 2023-03-02 DIAGNOSIS — N91.1 SECONDARY AMENORRHEA: ICD-10-CM

## 2023-03-02 PROCEDURE — 99212 OFFICE O/P EST SF 10 MIN: CPT

## 2023-03-02 PROCEDURE — 76830 TRANSVAGINAL US NON-OB: CPT

## 2023-03-06 ENCOUNTER — NON-APPOINTMENT (OUTPATIENT)
Age: 38
End: 2023-03-06

## 2023-03-07 PROBLEM — N91.1 AMENORRHEA, SECONDARY: Status: ACTIVE | Noted: 2018-11-13

## 2023-03-13 ENCOUNTER — APPOINTMENT (OUTPATIENT)
Dept: HEMATOLOGY ONCOLOGY | Facility: CLINIC | Age: 38
End: 2023-03-13
Payer: COMMERCIAL

## 2023-03-13 ENCOUNTER — LABORATORY RESULT (OUTPATIENT)
Age: 38
End: 2023-03-13

## 2023-03-13 ENCOUNTER — NON-APPOINTMENT (OUTPATIENT)
Age: 38
End: 2023-03-13

## 2023-03-13 ENCOUNTER — APPOINTMENT (OUTPATIENT)
Dept: HEMATOLOGY ONCOLOGY | Facility: CLINIC | Age: 38
End: 2023-03-13

## 2023-03-13 ENCOUNTER — OUTPATIENT (OUTPATIENT)
Dept: OUTPATIENT SERVICES | Facility: HOSPITAL | Age: 38
LOS: 1 days | End: 2023-03-13
Payer: COMMERCIAL

## 2023-03-13 VITALS
BODY MASS INDEX: 21.53 KG/M2 | DIASTOLIC BLOOD PRESSURE: 76 MMHG | HEIGHT: 66 IN | OXYGEN SATURATION: 98 % | SYSTOLIC BLOOD PRESSURE: 130 MMHG | HEART RATE: 85 BPM | WEIGHT: 134 LBS | RESPIRATION RATE: 14 BRPM | TEMPERATURE: 96.5 F

## 2023-03-13 DIAGNOSIS — Z86.2 PERSONAL HISTORY OF DISEASES OF THE BLOOD AND BLOOD-FORMING ORGANS AND CERTAIN DISORDERS INVOLVING THE IMMUNE MECHANISM: ICD-10-CM

## 2023-03-13 DIAGNOSIS — Z98.890 OTHER SPECIFIED POSTPROCEDURAL STATES: Chronic | ICD-10-CM

## 2023-03-13 DIAGNOSIS — N93.8 OTHER SPECIFIED ABNORMAL UTERINE AND VAGINAL BLEEDING: ICD-10-CM

## 2023-03-13 PROCEDURE — 36415 COLL VENOUS BLD VENIPUNCTURE: CPT

## 2023-03-13 PROCEDURE — 88184 FLOWCYTOMETRY/ TC 1 MARKER: CPT

## 2023-03-13 PROCEDURE — 86359 T CELLS TOTAL COUNT: CPT

## 2023-03-13 PROCEDURE — 85027 COMPLETE CBC AUTOMATED: CPT

## 2023-03-13 PROCEDURE — 88189 FLOWCYTOMETRY/READ 16 & >: CPT

## 2023-03-13 PROCEDURE — 87205 SMEAR GRAM STAIN: CPT

## 2023-03-13 PROCEDURE — 86360 T CELL ABSOLUTE COUNT/RATIO: CPT

## 2023-03-13 PROCEDURE — 82085 ASSAY OF ALDOLASE: CPT

## 2023-03-13 PROCEDURE — 99213 OFFICE O/P EST LOW 20 MIN: CPT

## 2023-03-13 PROCEDURE — 83516 IMMUNOASSAY NONANTIBODY: CPT

## 2023-03-13 PROCEDURE — 88185 FLOWCYTOMETRY/TC ADD-ON: CPT

## 2023-03-13 PROCEDURE — 86355 B CELLS TOTAL COUNT: CPT

## 2023-03-13 PROCEDURE — 86357 NK CELLS TOTAL COUNT: CPT

## 2023-03-13 PROCEDURE — 82550 ASSAY OF CK (CPK): CPT

## 2023-03-14 DIAGNOSIS — N93.8 OTHER SPECIFIED ABNORMAL UTERINE AND VAGINAL BLEEDING: ICD-10-CM

## 2023-03-14 LAB
CK SERPL-CCNC: 48 U/L
HCT VFR BLD CALC: 38.2 %
HGB BLD-MCNC: 12.8 G/DL
MCHC RBC-ENTMCNC: 27.8 PG
MCHC RBC-ENTMCNC: 33.5 G/DL
MCV RBC AUTO: 82.9 FL
PLATELET # BLD AUTO: 204 K/UL
PMV BLD: 11 FL
RBC # BLD: 4.61 M/UL
RBC # FLD: 13.4 %
WBC # FLD AUTO: 6.26 K/UL

## 2023-03-15 DIAGNOSIS — Z86.2 PERSONAL HISTORY OF DISEASES OF THE BLOOD AND BLOOD-FORMING ORGANS AND CERTAIN DISORDERS INVOLVING THE IMMUNE MECHANISM: ICD-10-CM

## 2023-03-15 LAB — ALDOLASE SERPL-CCNC: 3.9 U/L

## 2023-03-15 NOTE — ASSESSMENT
[FreeTextEntry1] : Healthy 36 year old female with Chronic Leukopenia with mild lymphopenia .\par \par \par 1. Chronic Leukopenia ( lymphopenia ? ) \par 2.Chronic sinusitis ( allergic ? )  , recurrent polyps . \par \par Plan : cbc , Flow cytometry \par          reassured ,

## 2023-03-15 NOTE — HISTORY OF PRESENT ILLNESS
[de-identified] : 3/13/2023 Patient was seen previously for mild leukopenia ( lymphopenia ? )  , negative work up . She is now pregnant in first trimester. SHe complains of mild congestion , post nasal drip due to chronic sinusitis ( allergic ? ) with recurrent polyps. questionable allergy to NSAIDs? no history of asthma. \par Of note her third pregnancy was complicated with fetal anomalies including intra uterine growth retardation.

## 2023-03-29 LAB — MI2 AB SER QL: NEGATIVE

## 2023-10-13 NOTE — PROCEDURE NOTE - NSMALLAMPATI_GEN_A_CORE
External labs entered for Review  Labs completed on 10/12/23  Results routed to VAD Coordinator    
Class 2

## 2023-10-22 NOTE — OB RN TRIAGE NOTE - PT NEEDS ASSIST
Iris Placement    Tube Team verified patient name and medical record number prior to tube placement. Iris feeding tube (55 inches, 10 Cymro) placed at 100cm in left nare. Per Iris picture, tube appears to be in the small bowel.    Nursing Instructions: Await KUB to confirm placement before use for medications or feeding. Stylet, may be removed, please place in labeled bag with insufflation bulb and save in patient medication drawer.    no

## 2025-01-03 NOTE — PHYSICAL EXAM
The patient is a 77y year old Female complaining of sent by MD. [ENT Examination] : normal [Examination Of The Lungs] : normal [Examination Of The Cardiovascular System] : normal [Examination Of The Abdomen] : normal [Peripheral Vascular Exam] : normal